# Patient Record
Sex: MALE | Race: OTHER | Employment: OTHER | URBAN - METROPOLITAN AREA
[De-identification: names, ages, dates, MRNs, and addresses within clinical notes are randomized per-mention and may not be internally consistent; named-entity substitution may affect disease eponyms.]

---

## 2017-03-15 ENCOUNTER — OFFICE VISIT (OUTPATIENT)
Dept: INTERNAL MEDICINE CLINIC | Facility: CLINIC | Age: 47
End: 2017-03-15

## 2017-03-15 VITALS
HEART RATE: 68 BPM | DIASTOLIC BLOOD PRESSURE: 62 MMHG | RESPIRATION RATE: 16 BRPM | WEIGHT: 186 LBS | BODY MASS INDEX: 34.23 KG/M2 | SYSTOLIC BLOOD PRESSURE: 98 MMHG | TEMPERATURE: 99 F | HEIGHT: 62 IN

## 2017-03-15 DIAGNOSIS — H00.14 CHALAZION LEFT UPPER EYELID: ICD-10-CM

## 2017-03-15 DIAGNOSIS — Z98.1 HISTORY OF SPINAL FUSION: ICD-10-CM

## 2017-03-15 DIAGNOSIS — M54.42 ACUTE LEFT-SIDED LOW BACK PAIN WITH LEFT-SIDED SCIATICA: ICD-10-CM

## 2017-03-15 DIAGNOSIS — M54.2 ACUTE NECK PAIN: Primary | ICD-10-CM
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
## 2017-03-15 PROCEDURE — 99214 OFFICE O/P EST MOD 30 MIN: CPT | Performed by: NURSE PRACTITIONER

## 2017-03-15 RX ORDER — NAPROXEN 500 MG/1
500 TABLET ORAL 2 TIMES DAILY WITH MEALS
Qty: 14 TABLET | Refills: 0 | Status: SHIPPED | OUTPATIENT
Start: 2017-03-15 | End: 2017-03-25 | Stop reason: ALTCHOICE

## 2017-03-15 RX ORDER — TOBRAMYCIN 3 MG/ML
SOLUTION/ DROPS OPHTHALMIC
Refills: 0 | COMMUNITY
Start: 2017-02-25 | End: 2017-04-25 | Stop reason: ALTCHOICE

## 2017-03-15 RX ORDER — CYCLOBENZAPRINE HCL 5 MG
TABLET ORAL
Refills: 0 | COMMUNITY
Start: 2017-03-10 | End: 2017-03-25 | Stop reason: ALTCHOICE

## 2017-03-15 RX ORDER — POLYMYXIN B SULFATE AND TRIMETHOPRIM 1; 10000 MG/ML; [USP'U]/ML
SOLUTION OPHTHALMIC
Refills: 0 | COMMUNITY
Start: 2017-02-25 | End: 2017-04-25 | Stop reason: ALTCHOICE

## 2017-03-15 RX ORDER — TRAMADOL HYDROCHLORIDE 50 MG/1
TABLET ORAL
Refills: 0 | COMMUNITY
Start: 2017-03-10 | End: 2017-03-25 | Stop reason: ALTCHOICE
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
## 2017-03-16 ENCOUNTER — HOSPITAL ENCOUNTER (OUTPATIENT)
Dept: GENERAL RADIOLOGY | Facility: HOSPITAL | Age: 47
Discharge: HOME | End: 2017-03-16
Attending: NURSE PRACTITIONER
Payer: COMMERCIAL

## 2017-03-16 DIAGNOSIS — M54.2 ACUTE NECK PAIN: ICD-10-CM

## 2017-03-16 DIAGNOSIS — M54.42 ACUTE LEFT-SIDED LOW BACK PAIN WITH LEFT-SIDED SCIATICA: ICD-10-CM

## 2017-03-16 PROCEDURE — 72110 X-RAY EXAM L-2 SPINE 4/>VWS: CPT

## 2017-03-16 PROCEDURE — 72050 X-RAY EXAM NECK SPINE 4/5VWS: CPT

## 2017-03-21 ENCOUNTER — OFFICE VISIT (OUTPATIENT)
Dept: PHYSICAL THERAPY | Age: 47
End: 2017-03-21
Attending: NURSE PRACTITIONER
Payer: COMMERCIAL

## 2017-03-21 DIAGNOSIS — M54.2 ACUTE NECK PAIN: Primary | ICD-10-CM

## 2017-03-21 PROCEDURE — 97161 PT EVAL LOW COMPLEX 20 MIN: CPT

## 2017-03-21 PROCEDURE — 97110 THERAPEUTIC EXERCISES: CPT

## 2017-03-21 NOTE — PROGRESS NOTES
SPINE EVALUATION:   Referring Physician: Dr. Dave Helton  Diagnosis: Lumbar spine muscle spasms    Date of Service: 3/21/2017     PATIENT SUMMARY   Jack Rivero is a 55year old y/o male who presents to therapy today with complaints of mid, low back pain, lumbar spine surgical incision (old fusion). No shift or scoliosis noted. Kyphotic/flexed trunk in standing. T/L junction with increased tissue girth (edema?)    Neuro Screen: No deficits noted with light touch to lower legs and foot.  LE DTR's symmetrical, sitting such as with occupation of  with minimal difficulty or pain.   -Complaint and independent in progressive HEP. Frequency / Duration: Patient will be seen for 2 x/week or a total of 12 visits over a 90 day period.  Treatment will inclu

## 2017-03-23 ENCOUNTER — OFFICE VISIT (OUTPATIENT)
Dept: PHYSICAL THERAPY | Age: 47
End: 2017-03-23
Attending: NURSE PRACTITIONER
Payer: COMMERCIAL

## 2017-03-23 ENCOUNTER — TELEPHONE (OUTPATIENT)
Dept: INTERNAL MEDICINE CLINIC | Facility: CLINIC | Age: 47
End: 2017-03-23

## 2017-03-23 PROCEDURE — 97110 THERAPEUTIC EXERCISES: CPT

## 2017-03-23 PROCEDURE — 97112 NEUROMUSCULAR REEDUCATION: CPT

## 2017-03-23 PROCEDURE — 97140 MANUAL THERAPY 1/> REGIONS: CPT

## 2017-03-23 NOTE — TELEPHONE ENCOUNTER
Called patient advised form received but being off work was not discussed with JV at apt.   Pt states that he thought when Sal ordered PT for two weeks it meant that he would be off of work for two weeks, pt advised I do not see any documentation indicati

## 2017-03-23 NOTE — PROGRESS NOTES
Dx: Lumbar spine muscle spasms         Authorized # of Visits:  10         Next MD visit: none scheduled  Fall Risk: standard         Precautions: n/a             Subjective: Reports he is walking up to 15 minutes at a time, increasing activity level.   Trice Berry from floor with minimal difficulty or pain. - Improve posture awareness so pt can self correct, tolerate prolonged sitting such as with occupation of  with minimal difficulty or pain.   -Complaint and independent in progressive HEP.      Plan:

## 2017-03-25 ENCOUNTER — OFFICE VISIT (OUTPATIENT)
Dept: INTERNAL MEDICINE CLINIC | Facility: CLINIC | Age: 47
End: 2017-03-25

## 2017-03-25 VITALS
HEIGHT: 69 IN | HEART RATE: 56 BPM | DIASTOLIC BLOOD PRESSURE: 72 MMHG | WEIGHT: 182 LBS | RESPIRATION RATE: 16 BRPM | SYSTOLIC BLOOD PRESSURE: 116 MMHG | TEMPERATURE: 98 F | BODY MASS INDEX: 26.96 KG/M2

## 2017-03-25 DIAGNOSIS — R20.8 NUMBNESS OF LEFT FOOT: ICD-10-CM

## 2017-03-25 DIAGNOSIS — M54.16 LUMBAR BACK PAIN WITH RADICULOPATHY AFFECTING LEFT LOWER EXTREMITY: Primary | ICD-10-CM

## 2017-03-25 PROCEDURE — 99213 OFFICE O/P EST LOW 20 MIN: CPT | Performed by: NURSE PRACTITIONER

## 2017-03-25 RX ORDER — CYCLOBENZAPRINE HCL 10 MG
10 TABLET ORAL NIGHTLY PRN
Qty: 20 TABLET | Refills: 0 | Status: SHIPPED | OUTPATIENT
Start: 2017-03-25 | End: 2017-04-13 | Stop reason: ALTCHOICE

## 2017-03-25 RX ORDER — METHYLPREDNISOLONE 4 MG/1
TABLET ORAL
Qty: 1 KIT | Refills: 0 | Status: SHIPPED | OUTPATIENT
Start: 2017-03-25 | End: 2017-04-13 | Stop reason: ALTCHOICE
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
## 2017-03-27 ENCOUNTER — OFFICE VISIT (OUTPATIENT)
Dept: PHYSICAL THERAPY | Age: 47
End: 2017-03-27
Attending: NURSE PRACTITIONER
Payer: COMMERCIAL

## 2017-03-27 ENCOUNTER — APPOINTMENT (OUTPATIENT)
Dept: PHYSICAL THERAPY | Age: 47
End: 2017-03-27
Attending: NURSE PRACTITIONER

## 2017-03-27 PROCEDURE — 97110 THERAPEUTIC EXERCISES: CPT

## 2017-03-27 PROCEDURE — 97112 NEUROMUSCULAR REEDUCATION: CPT

## 2017-03-27 NOTE — PROGRESS NOTES
Dx: Lumbar spine muscle spasms         Authorized # of Visits:  10         Next MD visit: none scheduled  Fall Risk: standard         Precautions: n/a             Subjective: Reports he is performing HEP as asked.  Back pain and stiffness has decreased some minimal to slight today, improving. Reviewed initial HEP which required cueing to perform correctly. Goals being addressed, none met. May not need cervical spine screen and tx due to no complaints with any current tx ex, demonstrates good fluid motions.

## 2017-03-28 ENCOUNTER — OFFICE VISIT (OUTPATIENT)
Dept: PHYSICAL THERAPY | Age: 47
End: 2017-03-28
Attending: NURSE PRACTITIONER
Payer: COMMERCIAL

## 2017-03-28 PROCEDURE — 97110 THERAPEUTIC EXERCISES: CPT

## 2017-03-28 PROCEDURE — 97112 NEUROMUSCULAR REEDUCATION: CPT

## 2017-03-28 NOTE — PROGRESS NOTES
Dx: Lumbar spine muscle spasms         Authorized # of Visits:  10         Next MD visit: none scheduled  Fall Risk: standard         Precautions: n/a             Subjective: Reports he began an oral steroid yesterday.   Was issued by MD who he has weekly f each Seated: B OH rows with #20 wt x 10 2 sets Shuttle:  -B squats, level 6, x 15   -B heel raises, level 6,   X 15 reps       Hook lying:  Passive L and R piriformis stretches hold 20 sec x 3 each Shuttle:  -B squats, level 6, x 15 2 sets  -B heel raises, Time: 50 min

## 2017-03-30 ENCOUNTER — APPOINTMENT (OUTPATIENT)
Dept: PHYSICAL THERAPY | Age: 47
End: 2017-03-30
Attending: NURSE PRACTITIONER

## 2017-03-31 ENCOUNTER — OFFICE VISIT (OUTPATIENT)
Dept: INTERNAL MEDICINE CLINIC | Facility: CLINIC | Age: 47
End: 2017-03-31

## 2017-03-31 VITALS
SYSTOLIC BLOOD PRESSURE: 118 MMHG | DIASTOLIC BLOOD PRESSURE: 70 MMHG | BODY MASS INDEX: 27.01 KG/M2 | HEART RATE: 76 BPM | HEIGHT: 69 IN | RESPIRATION RATE: 16 BRPM | TEMPERATURE: 99 F | WEIGHT: 182.38 LBS

## 2017-03-31 DIAGNOSIS — M54.16 LUMBAR RADICULOPATHY: Primary | ICD-10-CM

## 2017-03-31 PROCEDURE — 99213 OFFICE O/P EST LOW 20 MIN: CPT | Performed by: NURSE PRACTITIONER
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
## 2017-04-03 ENCOUNTER — APPOINTMENT (OUTPATIENT)
Dept: PHYSICAL THERAPY | Age: 47
End: 2017-04-03
Attending: NURSE PRACTITIONER

## 2017-04-03 ENCOUNTER — TELEPHONE (OUTPATIENT)
Dept: INTERNAL MEDICINE CLINIC | Facility: CLINIC | Age: 47
End: 2017-04-03

## 2017-04-03 ENCOUNTER — OFFICE VISIT (OUTPATIENT)
Dept: PHYSICAL THERAPY | Age: 47
End: 2017-04-03
Attending: NURSE PRACTITIONER

## 2017-04-03 DIAGNOSIS — M54.16 LUMBAR RADICULOPATHY: Primary | ICD-10-CM

## 2017-04-03 PROCEDURE — 97112 NEUROMUSCULAR REEDUCATION: CPT

## 2017-04-03 PROCEDURE — 97140 MANUAL THERAPY 1/> REGIONS: CPT

## 2017-04-03 PROCEDURE — 97110 THERAPEUTIC EXERCISES: CPT

## 2017-04-03 NOTE — PROGRESS NOTES
Dx: Lumbar spine muscle spasms         Authorized # of Visits:  10         Next MD visit: none scheduled  Fall Risk: standard         Precautions: n/a             Subjective: Pt reports that majority of back symptoms have resolved except at lumbar surgical PT  10 min  Prone:  -thoracic and lumbar central P/A's and spring tests, all with local pain, worse lower lumbar      Stand erect against wall,  Alternate OH arm raises, main neutral posture,  X 20 each L/R trunk rotation 2.5 wt x 10 2 sets each side Seate flexibility to WFL's so pt can lift light objects such as bag of groceries from floor with minimal difficulty or pain.   - Improve posture awareness so pt can self correct, tolerate prolonged sitting such as with occupation of  with minimal diff

## 2017-04-03 NOTE — TELEPHONE ENCOUNTER
LOV 3/31/17  With SD Noted that : Pt prefers to see Dr Dominic King at [facility] as that is who did his previous surgery. Per pt- Dr Rod Pearce no longer takes his insurance.  Pt was advise to ask insurance what providers are covered- Pt prefers SD

## 2017-04-05 ENCOUNTER — OFFICE VISIT (OUTPATIENT)
Dept: PHYSICAL THERAPY | Age: 47
End: 2017-04-05
Attending: NURSE PRACTITIONER

## 2017-04-05 PROCEDURE — 97112 NEUROMUSCULAR REEDUCATION: CPT

## 2017-04-05 PROCEDURE — 97110 THERAPEUTIC EXERCISES: CPT
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
## 2017-04-05 NOTE — PROGRESS NOTES
Dx: Lumbar spine muscle spasms         Authorized # of Visits:  10         Next MD visit: none scheduled  Fall Risk: standard         Precautions: n/a             Subjective: States back \" is a little better\" today.   C/o L lateral foot foot paresthesia \ machine:  -B lat pullbacks #10 wt  X 10 2 sets L/R trunk rotation 2.5 wt x 15 each side Man PT  10 min  Prone:  -thoracic and lumbar central P/A's and spring tests, all with local pain, worse lower lumbar      Stand erect against wall,  Alternate OH arm ra sitting such as with occupation of  with minimal difficulty or pain.   -Complaint and independent in progressive HEP. Plan:  Progress program and HEP flexibility and stability program to CKC exercises.     Charges:  there ex 1  NM re ed 2

## 2017-04-05 NOTE — TELEPHONE ENCOUNTER
Pt states he called Dr. Nadeem Greene office, there is no referral.  States first opening is not until April 25th.

## 2017-04-06 ENCOUNTER — APPOINTMENT (OUTPATIENT)
Dept: PHYSICAL THERAPY | Age: 47
End: 2017-04-06
Attending: NURSE PRACTITIONER

## 2017-04-06 ENCOUNTER — HOSPITAL ENCOUNTER (OUTPATIENT)
Dept: MRI IMAGING | Facility: HOSPITAL | Age: 47
Discharge: HOME | End: 2017-04-06
Attending: NURSE PRACTITIONER
Payer: COMMERCIAL

## 2017-04-06 DIAGNOSIS — M54.16 LUMBAR BACK PAIN WITH RADICULOPATHY AFFECTING LEFT LOWER EXTREMITY: ICD-10-CM

## 2017-04-06 DIAGNOSIS — R20.8 NUMBNESS OF LEFT FOOT: ICD-10-CM

## 2017-04-06 PROCEDURE — 72148 MRI LUMBAR SPINE W/O DYE: CPT

## 2017-04-06 NOTE — TELEPHONE ENCOUNTER
S/w pt, he said he was offered the option of seeing the nurse practitioner, but he wanted to see the doctor.   Suggested that he schedule with the NP to get started, otherwise he would need to wait for 4/25 with the Dr.  He will call us back if he has a pro

## 2017-04-10 ENCOUNTER — APPOINTMENT (OUTPATIENT)
Dept: PHYSICAL THERAPY | Age: 47
End: 2017-04-10
Attending: NURSE PRACTITIONER

## 2017-04-10 ENCOUNTER — OFFICE VISIT (OUTPATIENT)
Dept: PHYSICAL THERAPY | Age: 47
End: 2017-04-10
Attending: NURSE PRACTITIONER

## 2017-04-10 PROCEDURE — 97112 NEUROMUSCULAR REEDUCATION: CPT

## 2017-04-10 NOTE — PROGRESS NOTES
Dx: Lumbar spine muscle spasms         Authorized # of Visits:  10         Next MD visit: none scheduled  Fall Risk: standard         Precautions: n/a             Subjective: Had MRI. Was told has bulging disc.   Remains with c/o \"pinching\" at T/L region SLR   negative    -LE DTR's WNL's    L and R lower quadrant test into extension negative Modified dead lift with B UE reach to chair seat height x 5 each side  3 sets each    Sit: trunk flex stretch hold 10 sec x 3 Modified dead lift with B UE reach to [place] met.  Intermittent pain making remaining goals inconsistent at this time, but is steadily progressing in those areas.      Goals:   -Restore lumbar and thoracic spine AROM to WFL's so pt can return to performing LE bathing and dressing activities with minim

## 2017-04-12 ENCOUNTER — OFFICE VISIT (OUTPATIENT)
Dept: PHYSICAL THERAPY | Age: 47
End: 2017-04-12
Attending: NURSE PRACTITIONER

## 2017-04-12 PROCEDURE — 97530 THERAPEUTIC ACTIVITIES: CPT

## 2017-04-12 NOTE — PROGRESS NOTES
Dx: Lumbar spine muscle spasms         Authorized # of Visits:  10         Next MD visit: none scheduled  Fall Risk: standard         Precautions: n/a             Subjective:  No complaints now, no pain.  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 These activities needed for daily house chores, yard work, demands of  job. Overall pt did well.   No productio

## 2017-04-13 ENCOUNTER — APPOINTMENT (OUTPATIENT)
Dept: PHYSICAL THERAPY | Age: 47
End: 2017-04-13
Attending: NURSE PRACTITIONER

## 2017-04-13 ENCOUNTER — OFFICE VISIT (OUTPATIENT)
Dept: SURGERY | Facility: CLINIC | Age: 47
End: 2017-04-13

## 2017-04-13 VITALS
DIASTOLIC BLOOD PRESSURE: 66 MMHG | HEIGHT: 69 IN | WEIGHT: 180 LBS | SYSTOLIC BLOOD PRESSURE: 114 MMHG | HEART RATE: 72 BPM | BODY MASS INDEX: 26.66 KG/M2 | RESPIRATION RATE: 16 BRPM

## 2017-04-13 DIAGNOSIS — M54.16 LUMBAR RADICULOPATHY: Primary | ICD-10-CM

## 2017-04-13 DIAGNOSIS — M51.36 DDD (DEGENERATIVE DISC DISEASE), LUMBAR: ICD-10-CM

## 2017-04-13 DIAGNOSIS — Z98.1 HISTORY OF LUMBAR FUSION: ICD-10-CM

## 2017-04-13 PROCEDURE — 99204 OFFICE O/P NEW MOD 45 MIN: CPT | Performed by: NURSE PRACTITIONER

## 2017-04-13 NOTE — PATIENT INSTRUCTIONS
Refill policies:    • Allow 2 business days for refills; controlled substances may take longer.   • Contact your pharmacy at least 5 days prior to running out of medication and have them send an electronic request or submit request through the “request re insurance carrier to obtain pre-certification or prior authorization. Unfortunately, [facility] has seen an increase in denial of payment even though the procedure/test has been pre-certified.   You are strongly encouraged to contact your insurance carrier to v
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
## 2017-04-13 NOTE — PROGRESS NOTES
Location of Pain:     Date Pain Began: March 8th          Work Related:   No        Receiving Work Comp/Disability:   No    Numeric Rating Scale:  Pain at Present:  5

## 2017-04-17 ENCOUNTER — APPOINTMENT (OUTPATIENT)
Dept: PHYSICAL THERAPY | Age: 47
End: 2017-04-17
Attending: NURSE PRACTITIONER

## 2017-04-20 ENCOUNTER — APPOINTMENT (OUTPATIENT)
Dept: PHYSICAL THERAPY | Age: 47
End: 2017-04-20
Attending: NURSE PRACTITIONER

## 2017-04-25 ENCOUNTER — OFFICE VISIT (OUTPATIENT)
Dept: SURGERY | Facility: CLINIC | Age: 47
End: 2017-04-25

## 2017-04-25 VITALS
DIASTOLIC BLOOD PRESSURE: 78 MMHG | BODY MASS INDEX: 26.07 KG/M2 | SYSTOLIC BLOOD PRESSURE: 126 MMHG | WEIGHT: 176 LBS | RESPIRATION RATE: 16 BRPM | HEART RATE: 68 BPM | HEIGHT: 69 IN
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
## 2017-04-25 DIAGNOSIS — Z98.1 HISTORY OF SPINAL FUSION: Primary | ICD-10-CM
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
## 2017-04-25 PROCEDURE — 99213 OFFICE O/P EST LOW 20 MIN: CPT | Performed by: NEUROLOGICAL SURGERY
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
## 2017-04-26 ENCOUNTER — OFFICE VISIT (OUTPATIENT)
Dept: PHYSICAL THERAPY | Age: 47
End: 2017-04-26
Attending: NURSE PRACTITIONER
Payer: COMMERCIAL

## 2017-04-26 PROCEDURE — 97140 MANUAL THERAPY 1/> REGIONS: CPT

## 2017-04-26 PROCEDURE — 97112 NEUROMUSCULAR REEDUCATION: CPT

## 2017-04-26 NOTE — PROGRESS NOTES
Dx: Lumbar spine muscle spasms         Authorized # of Visits:  10         Next MD visit: none scheduled  Fall Risk: standard         Precautions: n/a             Subjective:  Reports decreased pain since last seen.   Lateral L foot paresthesia remains inte total  2  Sets   Man PT:  -mid to lower t spine manip's supine and prone  10 min    -lumbar P/A's upper/mid  Lumbar grade 2,3,4  6 min    HEP:  seated lumbar flex ROM stretch in chair, hold 15 sec x 3 Stand erect against wall,  Alternate OH arm raises, leah wt x 10 2 sets Shuttle:  -B squats, level 6, x 15   -B heel raises, level 6,   X 15 reps    L and R trunk rotation with diagonal  High to low lifts, 7.5 wt x 10 2 sets each side     Hook lying:  Passive L and R piriformis stretches hold 20 sec x 3 each Eulis Sloop

## 2017-04-28 ENCOUNTER — TELEPHONE (OUTPATIENT)
Dept: INTERNAL MEDICINE CLINIC | Facility: CLINIC | Age: 47
End: 2017-04-28

## 2017-04-28 NOTE — TELEPHONE ENCOUNTER
Called pt to advise info per JV that referral should be valid. Pt states that he will call and make appt. Pt states that if any problems he will call back with new provider's name so referral can be changed. Pt had no further questions at this time.

## 2017-05-01 ENCOUNTER — OFFICE VISIT (OUTPATIENT)
Dept: PHYSICAL THERAPY | Age: 47
End: 2017-05-01
Attending: NURSE PRACTITIONER
Payer: COMMERCIAL

## 2017-05-01 NOTE — PROGRESS NOTES
Dx: Lumbar spine muscle spasms         Authorized # of Visits:  10         Next MD visit: none scheduled  Fall Risk: standard         Precautions: n/a         Physical Therapy Discharge Report  10 sessions completed.         Subjective:  Reports majority of negative    6 min -stand: L and R lateral trunk bend x 10 each     -L and R lateral lateral lunges x 15 each -stand: L and R lateral trunk bend x 10 each     -L and R  lateral lunges x 15 each Cable machine:  -alternate punches/pushing, # 10 each x 20 tota tests, MMT LE's and trunk, ROM. 8 min. See MD report.     Stand erect against wall,  Alternate OH arm raises, main neutral posture,  X 20 each L/R trunk rotation 2.5 wt x 10 2 sets each side Seated: B OH rows with #20 wt x 15 2    Standing, alternate delt AROM to WFL's so pt can return to performing LE bathing and dressing activities with minimal difficulty and pain.   -Restore trunk strength to WFL's so pt can perform standing activities such as with food preparation with minimal difficulty or pain.   -Impr

## 2017-05-18 ENCOUNTER — OFFICE VISIT (OUTPATIENT)
Dept: SURGERY | Facility: CLINIC | Age: 47
End: 2017-05-18

## 2017-05-18 VITALS — DIASTOLIC BLOOD PRESSURE: 60 MMHG | HEART RATE: 60 BPM | SYSTOLIC BLOOD PRESSURE: 112 MMHG
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
## 2017-05-18 DIAGNOSIS — M51.36 DDD (DEGENERATIVE DISC DISEASE), LUMBAR: ICD-10-CM

## 2017-05-18 PROCEDURE — 99213 OFFICE O/P EST LOW 20 MIN: CPT | Performed by: NEUROLOGICAL SURGERY

## 2017-05-18 RX ORDER — DIAZEPAM 10 MG/1
5 TABLET ORAL ONCE AS NEEDED
Qty: 2 TABLET | Refills: 0 | Status: SHIPPED | OUTPATIENT
Start: 2017-05-18 | End: 2017-05-18

## 2017-05-18 NOTE — PROGRESS NOTES
Neurosurgery Clinic Visit  2017    Rachna Barbosa PCP:  Kristen An MD    10/24/1970 MRN [id number]     HISTORY OF PRESENT ILLNESS:  Rachna Barbosa is a(n) 55year old male who is here for follow-up of low back pain and radiculopathy.   Si

## 2017-05-18 NOTE — PROGRESS NOTES
Pt reports he has occasional pain in lower back and in left foot. Pain usually comes on when sitting for to long.  Pt unable to rate pain on pain scale, \"pain but not severe pain\"

## 2017-05-25 ENCOUNTER — TELEPHONE (OUTPATIENT)
Dept: SURGERY | Facility: CLINIC | Age: 47
End: 2017-05-25

## 2017-05-26 ENCOUNTER — TELEPHONE (OUTPATIENT)
Dept: INTERNAL MEDICINE CLINIC | Facility: CLINIC | Age: 47
End: 2017-05-26

## 2017-05-26 ENCOUNTER — HOSPITAL ENCOUNTER (OUTPATIENT)
Dept: MRI IMAGING | Facility: HOSPITAL | Age: 47
Discharge: HOME | End: 2017-05-26
Attending: NURSE PRACTITIONER
Payer: COMMERCIAL

## 2017-05-26 DIAGNOSIS — M54.16 LUMBAR RADICULOPATHY: ICD-10-CM

## 2017-05-26 PROCEDURE — 72148 MRI LUMBAR SPINE W/O DYE: CPT | Performed by: NURSE PRACTITIONER

## 2017-05-26 NOTE — TELEPHONE ENCOUNTER
Pt was notified that letter has been generated. Pt states that he will come in 5/24/17 to  letter. Letter is at  folder.  Nothing further
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
## 2017-06-01 ENCOUNTER — OFFICE VISIT (OUTPATIENT)
Dept: SURGERY | Facility: CLINIC | Age: 47
End: 2017-06-01

## 2017-06-01 VITALS — SYSTOLIC BLOOD PRESSURE: 110 MMHG | DIASTOLIC BLOOD PRESSURE: 60 MMHG | RESPIRATION RATE: 17 BRPM | HEART RATE: 60 BPM
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
## 2017-06-01 PROCEDURE — 99213 OFFICE O/P EST LOW 20 MIN: CPT | Performed by: NEUROLOGICAL SURGERY

## 2017-06-01 NOTE — PATIENT INSTRUCTIONS
Refill policies:    • Allow 2-3 business days for refills; controlled substances may take longer.   • Contact your pharmacy at least 5 days prior to running out of medication and have them send an electronic request or submit request through the [facility] have a procedure or additional testing performed. [facility]) will contact your insurance carrier to obtain pre-certification or prior authorization.     Unfortunately, [facility] has seen an increase in denial of payment even though the p

## 2017-06-02 NOTE — PROGRESS NOTES
Neurosurgery Clinic Visit  2017    Rebeccaolga Rubia PCP:  Aline Nation MD    10/24/1970 MRN [id number]     HISTORY OF PRESENT ILLNESS:  Ned Barkley is a(n) 55year old male here for f/u  Doing well  Back pain improved  Wants to go back to

## 2017-09-19 ENCOUNTER — OFFICE VISIT (OUTPATIENT)
Dept: INTERNAL MEDICINE CLINIC | Facility: CLINIC | Age: 47
End: 2017-09-19

## 2017-09-19 VITALS
DIASTOLIC BLOOD PRESSURE: 68 MMHG | HEART RATE: 72 BPM | WEIGHT: 181 LBS | SYSTOLIC BLOOD PRESSURE: 120 MMHG | HEIGHT: 69 IN | BODY MASS INDEX: 26.81 KG/M2 | TEMPERATURE: 99 F

## 2017-09-19 DIAGNOSIS — M54.10 RADICULAR LOW BACK PAIN: Primary | ICD-10-CM

## 2017-09-19 PROCEDURE — 99213 OFFICE O/P EST LOW 20 MIN: CPT | Performed by: NURSE PRACTITIONER

## 2017-09-19 RX ORDER — CYCLOBENZAPRINE HCL 10 MG
10 TABLET ORAL NIGHTLY PRN
Qty: 30 TABLET | Refills: 0 | Status: SHIPPED | OUTPATIENT
Start: 2017-09-19 | End: 2017-10-09

## 2017-09-19 RX ORDER — METHYLPREDNISOLONE 4 MG/1
TABLET ORAL
Qty: 1 KIT | Refills: 0 | Status: SHIPPED | OUTPATIENT
Start: 2017-09-19 | End: 2018-12-28

## 2017-09-19 RX ORDER — TRAMADOL HYDROCHLORIDE 50 MG/1
50 TABLET ORAL EVERY 8 HOURS PRN
Qty: 60 TABLET | Refills: 0 | Status: SHIPPED | OUTPATIENT
Start: 2017-09-19 | End: 2020-09-28
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
## 2017-10-25 ENCOUNTER — TELEPHONE (OUTPATIENT)
Dept: INTERNAL MEDICINE CLINIC | Facility: CLINIC | Age: 47
End: 2017-10-25

## 2017-10-25 DIAGNOSIS — Z13.228 SCREENING FOR METABOLIC DISORDER: ICD-10-CM

## 2017-10-25 DIAGNOSIS — Z13.220 SCREENING FOR LIPID DISORDERS: Primary | ICD-10-CM

## 2017-10-25 DIAGNOSIS — Z13.0 SCREENING FOR DISORDER OF BLOOD AND BLOOD-FORMING ORGANS: ICD-10-CM

## 2017-10-25 DIAGNOSIS — Z13.29 SCREENING FOR THYROID DISORDER: ICD-10-CM

## 2017-12-02 ENCOUNTER — LAB ENCOUNTER (OUTPATIENT)
Dept: LAB | Facility: HOSPITAL | Age: 47
End: 2017-12-02
Attending: INTERNAL MEDICINE
Payer: MEDICAID

## 2017-12-02 DIAGNOSIS — Z13.29 SCREENING FOR THYROID DISORDER: ICD-10-CM

## 2017-12-02 DIAGNOSIS — Z13.220 SCREENING FOR LIPID DISORDERS: ICD-10-CM

## 2017-12-02 DIAGNOSIS — Z13.0 SCREENING FOR DISORDER OF BLOOD AND BLOOD-FORMING ORGANS: ICD-10-CM

## 2017-12-02 DIAGNOSIS — Z13.228 SCREENING FOR METABOLIC DISORDER: ICD-10-CM

## 2017-12-02 PROCEDURE — 80061 LIPID PANEL: CPT

## 2017-12-02 PROCEDURE — 84443 ASSAY THYROID STIM HORMONE: CPT

## 2017-12-02 PROCEDURE — 80053 COMPREHEN METABOLIC PANEL: CPT

## 2017-12-02 PROCEDURE — 36415 COLL VENOUS BLD VENIPUNCTURE: CPT

## 2017-12-02 PROCEDURE — 85025 COMPLETE CBC W/AUTO DIFF WBC: CPT

## 2017-12-04 ENCOUNTER — TELEPHONE (OUTPATIENT)
Dept: INTERNAL MEDICINE CLINIC | Facility: CLINIC | Age: 47
End: 2017-12-04

## 2017-12-04 DIAGNOSIS — E55.9 VITAMIN D DEFICIENCY: Primary | ICD-10-CM

## 2017-12-04 NOTE — TELEPHONE ENCOUNTER
Pt had labs at [facility] lab on Saturday and there was no order for Vit D-the lab did draw enough blood to hold until today for us to put order in system today if AS can put order in-pt went to the main lab at the hospital out patient-please call them and let

## 2017-12-04 NOTE — TELEPHONE ENCOUNTER
Last vitamin D 4/28/15  FOV 12/8/17    Order pended for your approval if ok. Please advise. Thank you.

## 2017-12-08 ENCOUNTER — HOSPITAL ENCOUNTER (OUTPATIENT)
Dept: GENERAL RADIOLOGY | Facility: HOSPITAL | Age: 47
Discharge: HOME | End: 2017-12-08
Attending: INTERNAL MEDICINE
Payer: MEDICAID

## 2017-12-08 ENCOUNTER — LAB ENCOUNTER (OUTPATIENT)
Dept: LAB | Age: 47
End: 2017-12-08
Attending: INTERNAL MEDICINE
Payer: MEDICAID

## 2017-12-08 ENCOUNTER — OFFICE VISIT (OUTPATIENT)
Dept: INTERNAL MEDICINE CLINIC | Facility: CLINIC | Age: 47
End: 2017-12-08

## 2017-12-08 VITALS
HEIGHT: 69.75 IN | WEIGHT: 184.38 LBS | DIASTOLIC BLOOD PRESSURE: 62 MMHG | BODY MASS INDEX: 26.69 KG/M2 | HEART RATE: 60 BPM | TEMPERATURE: 98 F | SYSTOLIC BLOOD PRESSURE: 100 MMHG | RESPIRATION RATE: 12 BRPM

## 2017-12-08 DIAGNOSIS — R06.02 SOB (SHORTNESS OF BREATH): ICD-10-CM

## 2017-12-08 DIAGNOSIS — Z00.00 PE (PHYSICAL EXAM), ANNUAL: Primary | ICD-10-CM

## 2017-12-08 DIAGNOSIS — R35.1 NOCTURIA: ICD-10-CM

## 2017-12-08 DIAGNOSIS — Z12.5 SCREENING PSA (PROSTATE SPECIFIC ANTIGEN): ICD-10-CM

## 2017-12-08 DIAGNOSIS — R53.82 CHRONIC FATIGUE: ICD-10-CM

## 2017-12-08 DIAGNOSIS — M79.672 LEFT FOOT PAIN: ICD-10-CM

## 2017-12-08 PROCEDURE — 82607 VITAMIN B-12: CPT | Performed by: INTERNAL MEDICINE

## 2017-12-08 PROCEDURE — 71020 XR CHEST PA + LAT CHEST (CPT=71020): CPT | Performed by: INTERNAL MEDICINE

## 2017-12-08 PROCEDURE — 84153 ASSAY OF PSA TOTAL: CPT | Performed by: INTERNAL MEDICINE

## 2017-12-08 PROCEDURE — 93000 ELECTROCARDIOGRAM COMPLETE: CPT | Performed by: INTERNAL MEDICINE

## 2017-12-08 PROCEDURE — 85378 FIBRIN DEGRADE SEMIQUANT: CPT | Performed by: INTERNAL MEDICINE

## 2017-12-08 PROCEDURE — 82306 VITAMIN D 25 HYDROXY: CPT | Performed by: INTERNAL MEDICINE

## 2017-12-08 PROCEDURE — 99396 PREV VISIT EST AGE 40-64: CPT | Performed by: INTERNAL MEDICINE

## 2017-12-08 PROCEDURE — 81003 URINALYSIS AUTO W/O SCOPE: CPT | Performed by: INTERNAL MEDICINE

## 2017-12-08 NOTE — PROGRESS NOTES
Patient presents with:  Physical: Exam Room 8: labs completed, still having pain in left toes, complaints of fatigue as well as some SOB, defers flu shot      HPI:  Here for cpe.  Pt still with low back pain previous fusion with some left foot pain which pe Former Smoker                                                              Packs/day: 0.00      Years: 0.00      Smokeless tobacco: Never Used                      Comment: never really smoked  Alcohol use:  No                  Current Outpatient Prescripti changes.     A/P:    Sob (shortness of breath)  Pe (physical exam), annual  (primary encounter diagnosis)  Chronic fatigue  Nocturia  Screening psa (prostate specific antigen)  Left foot pain    See dr german for back and left foot pain  cxr for sob, ekg

## 2018-12-20 ENCOUNTER — TELEPHONE (OUTPATIENT)
Dept: INTERNAL MEDICINE CLINIC | Facility: CLINIC | Age: 48
End: 2018-12-20

## 2018-12-20 DIAGNOSIS — Z00.00 ROUTINE GENERAL MEDICAL EXAMINATION AT A HEALTH CARE FACILITY: Primary | ICD-10-CM

## 2018-12-20 NOTE — TELEPHONE ENCOUNTER
CPE sched on   Future Appointments   Date Time Provider Ilda Mercado          2/4/2019  1:15 PM Andrei Jack MD [facility]     Orders to [employer]. Aware must fast no call back required.

## 2018-12-28 ENCOUNTER — OFFICE VISIT (OUTPATIENT)
Dept: INTERNAL MEDICINE CLINIC | Facility: CLINIC | Age: 48
End: 2018-12-28
Payer: MEDICAID

## 2018-12-28 VITALS
SYSTOLIC BLOOD PRESSURE: 126 MMHG | DIASTOLIC BLOOD PRESSURE: 74 MMHG | TEMPERATURE: 98 F | HEIGHT: 69.5 IN | WEIGHT: 191 LBS | RESPIRATION RATE: 16 BRPM | BODY MASS INDEX: 27.65 KG/M2 | HEART RATE: 64 BPM

## 2018-12-28 DIAGNOSIS — M48.061 NEURAL FORAMINAL STENOSIS OF LUMBAR SPINE: ICD-10-CM

## 2018-12-28 DIAGNOSIS — M25.78 OSTEOPHYTE OF VERTEBRAE: ICD-10-CM

## 2018-12-28 DIAGNOSIS — M62.830 LUMBAR PARASPINAL MUSCLE SPASM: ICD-10-CM

## 2018-12-28 DIAGNOSIS — M51.26 LUMBAR DISC HERNIATION: ICD-10-CM

## 2018-12-28 DIAGNOSIS — M54.16 LUMBAR RADICULOPATHY: Primary | ICD-10-CM

## 2018-12-28 DIAGNOSIS — Z98.1 STATUS POST LUMBAR SPINAL FUSION: ICD-10-CM

## 2018-12-28 PROCEDURE — 99213 OFFICE O/P EST LOW 20 MIN: CPT | Performed by: INTERNAL MEDICINE

## 2018-12-28 RX ORDER — METHYLPREDNISOLONE 4 MG/1
TABLET ORAL
Qty: 1 KIT | Refills: 1 | Status: SHIPPED | OUTPATIENT
Start: 2018-12-28 | End: 2019-01-25

## 2018-12-28 RX ORDER — CYCLOBENZAPRINE HCL 10 MG
10 TABLET ORAL 2 TIMES DAILY PRN
Qty: 28 TABLET | Refills: 1 | Status: SHIPPED | OUTPATIENT
Start: 2018-12-28 | End: 2019-01-11

## 2018-12-28 NOTE — PROGRESS NOTES
Ned Barkley  10/24/1970    Patient presents with:  Pain: cn room 6: patient is here for back and leg pain, patient states its been off an on but more recently       Zaria Cain is a 50year old male who presents with acute on chron today.      Current Outpatient Medications:  methylPREDNISolone (MEDROL) 4 MG Oral Tablet Therapy Pack As directed.  Disp: 1 kit Rfl: 1   Cyclobenzaprine HCl 10 MG Oral Tab Take 1 tablet (10 mg total) by mouth 2 (two) times daily as needed for Muscle spasms lower extremity numbness.     Acute on chronic lumbar radiculopathy without myelopathy:  Attributed to chronic findings as described in the subjective and acute lumbar paraspinal muscle strain  Prescribe steroid and muscle relaxant therapies  Advised applic

## 2019-01-04 ENCOUNTER — HOSPITAL ENCOUNTER (OUTPATIENT)
Dept: PHYSICAL THERAPY | Facility: HOSPITAL | Age: 49
Setting detail: THERAPIES SERIES
Discharge: HOME | End: 2019-01-04
Attending: INTERNAL MEDICINE
Payer: MEDICAID

## 2019-01-04 PROCEDURE — 97162 PT EVAL MOD COMPLEX 30 MIN: CPT

## 2019-01-04 NOTE — PROGRESS NOTES
SPINE EVALUATION:   Referring Physician: Dr. Uribe ref.  provider found  Diagnosis: Lumbar radiculopathy (M54.16) Date of Service: 1/4/2019     PATIENT SUMMARY   Rebecca Vance is a 50year old y/o male who presents to therapy today with complaints of ba to normal and not having any numbness/tingling down the leg. Past medical history was reviewed with Ken. No significant findings.      ASSESSMENT  Patient arrived to session today with significant radiating pain from his lower back and down into the L side of his back.      Palpation: Tenderness along spine in lower back, tenderness on paraspinals on L side of the back    Strength:   LE   Hip flexion: R 4/5; L 4/5  Hip abduction: R 5/5; L 5/5  Knee Flexion: R 5/5; L 4/5   Knee extension: R 5/5; L 4/5 * seen for 1-2 x/week or a total of 8 visits over a 90 day period. Treatment will include: Manual Therapy; Therapeutic Exercises; Neuromuscular Re-education; Therapeutic Activity;  Electrical Stim; Mechanical Traction; Pt education; Home exercise program inst

## 2019-01-09 ENCOUNTER — HOSPITAL ENCOUNTER (OUTPATIENT)
Dept: PHYSICAL THERAPY | Facility: HOSPITAL | Age: 49
Setting detail: THERAPIES SERIES
Discharge: HOME | End: 2019-01-09
Attending: INTERNAL MEDICINE
Payer: MEDICAID

## 2019-01-09 PROCEDURE — 97140 MANUAL THERAPY 1/> REGIONS: CPT

## 2019-01-09 PROCEDURE — 97110 THERAPEUTIC EXERCISES: CPT

## 2019-01-09 NOTE — PROGRESS NOTES
Dx: Low Back Pain With Radiculopathy         Authorized # of Visits:  6         Next MD visit: none scheduled  Fall Risk: standard         Precautions: n/a             Subjective:  The patient states that he is feeling a bit better than he was last week, bu 3/   Date:               TX#: 4/ Date:               TX#: 5/ Date:               TX#: 6/ Date:               TX#: 7/ Date:               TX#: 8/   STM to L lumbar paraspinals, quadratus lumborum, piriformis for pain relief         hooklying lower lumbar ro

## 2019-01-10 ENCOUNTER — APPOINTMENT (OUTPATIENT)
Dept: PHYSICAL THERAPY | Facility: HOSPITAL | Age: 49
End: 2019-01-10
Attending: INTERNAL MEDICINE
Payer: MEDICAID

## 2019-01-18 ENCOUNTER — APPOINTMENT (OUTPATIENT)
Dept: PHYSICAL THERAPY | Facility: HOSPITAL | Age: 49
End: 2019-01-18
Attending: INTERNAL MEDICINE
Payer: MEDICAID

## 2019-01-25 ENCOUNTER — HOSPITAL ENCOUNTER (OUTPATIENT)
Dept: PHYSICAL THERAPY | Facility: HOSPITAL | Age: 49
Setting detail: THERAPIES SERIES
Discharge: HOME | End: 2019-01-25
Attending: INTERNAL MEDICINE
Payer: MEDICAID

## 2019-01-25 ENCOUNTER — OFFICE VISIT (OUTPATIENT)
Dept: INTERNAL MEDICINE CLINIC | Facility: CLINIC | Age: 49
End: 2019-01-25
Payer: MEDICAID

## 2019-01-25 ENCOUNTER — LAB ENCOUNTER (OUTPATIENT)
Dept: LAB | Age: 49
End: 2019-01-25
Attending: INTERNAL MEDICINE
Payer: MEDICAID

## 2019-01-25 VITALS
DIASTOLIC BLOOD PRESSURE: 76 MMHG | RESPIRATION RATE: 16 BRPM | SYSTOLIC BLOOD PRESSURE: 118 MMHG | HEART RATE: 60 BPM | BODY MASS INDEX: 27.39 KG/M2 | TEMPERATURE: 98 F | WEIGHT: 189.19 LBS | HEIGHT: 69.5 IN

## 2019-01-25 DIAGNOSIS — Z00.00 ROUTINE GENERAL MEDICAL EXAMINATION AT A HEALTH CARE FACILITY: ICD-10-CM

## 2019-01-25 DIAGNOSIS — M48.061 NEURAL FORAMINAL STENOSIS OF LUMBAR SPINE: ICD-10-CM

## 2019-01-25 DIAGNOSIS — Z00.00 ANNUAL PHYSICAL EXAM: Primary | ICD-10-CM

## 2019-01-25 DIAGNOSIS — Z13.29 THYROID DISORDER SCREENING: ICD-10-CM

## 2019-01-25 DIAGNOSIS — E53.8 VITAMIN B12 DEFICIENCY: ICD-10-CM

## 2019-01-25 DIAGNOSIS — Z13.228 SCREENING FOR METABOLIC DISORDER: ICD-10-CM

## 2019-01-25 DIAGNOSIS — Z13.0 SCREENING FOR BLOOD DISEASE: ICD-10-CM

## 2019-01-25 DIAGNOSIS — Z13.220 SCREENING FOR LIPID DISORDERS: ICD-10-CM

## 2019-01-25 DIAGNOSIS — Z00.00 ANNUAL PHYSICAL EXAM: ICD-10-CM

## 2019-01-25 DIAGNOSIS — M54.16 LUMBAR RADICULOPATHY: ICD-10-CM

## 2019-01-25 DIAGNOSIS — Z13.1 SCREENING FOR DIABETES MELLITUS: ICD-10-CM

## 2019-01-25 DIAGNOSIS — E55.9 VITAMIN D DEFICIENCY: ICD-10-CM

## 2019-01-25 DIAGNOSIS — S33.131S: ICD-10-CM

## 2019-01-25 DIAGNOSIS — Z12.5 PROSTATE CANCER SCREENING: ICD-10-CM

## 2019-01-25 LAB
ALBUMIN SERPL-MCNC: 4.3 G/DL (ref 3.1–4.5)
ALBUMIN/GLOB SERPL: 1.3 {RATIO} (ref 1–2)
ALP LIVER SERPL-CCNC: 46 U/L (ref 45–117)
ALT SERPL-CCNC: 43 U/L (ref 17–63)
ANION GAP SERPL CALC-SCNC: 5 MMOL/L (ref 0–18)
AST SERPL-CCNC: 23 U/L (ref 15–41)
BASOPHILS # BLD AUTO: 0.06 X10(3) UL (ref 0–0.1)
BASOPHILS NFR BLD AUTO: 1 %
BILIRUB SERPL-MCNC: 0.4 MG/DL (ref 0.1–2)
BUN BLD-MCNC: 13 MG/DL (ref 8–20)
BUN/CREAT SERPL: 12.6 (ref 10–20)
CALCIUM BLD-MCNC: 8.8 MG/DL (ref 8.3–10.3)
CHLORIDE SERPL-SCNC: 106 MMOL/L (ref 101–111)
CHOLEST SMN-MCNC: 182 MG/DL (ref ?–200)
CO2 SERPL-SCNC: 29 MMOL/L (ref 22–32)
COMPLEXED PSA SERPL-MCNC: 1.04 NG/ML (ref 0.01–4)
CREAT BLD-MCNC: 1.03 MG/DL (ref 0.7–1.3)
EOSINOPHIL # BLD AUTO: 0.12 X10(3) UL (ref 0–0.3)
EOSINOPHIL NFR BLD AUTO: 1.9 %
ERYTHROCYTE [DISTWIDTH] IN BLOOD BY AUTOMATED COUNT: 12 % (ref 11.5–16)
EST. AVERAGE GLUCOSE BLD GHB EST-MCNC: 120 MG/DL (ref 68–126)
GLOBULIN PLAS-MCNC: 3.2 G/DL (ref 2.8–4.4)
GLUCOSE BLD-MCNC: 93 MG/DL (ref 70–99)
HAV AB SERPL IA-ACNC: 363 PG/ML (ref 193–986)
HBA1C MFR BLD HPLC: 5.8 % (ref ?–5.7)
HCT VFR BLD AUTO: 44.3 % (ref 37–53)
HDLC SERPL-MCNC: 43 MG/DL (ref 40–59)
HGB BLD-MCNC: 15.1 G/DL (ref 13–17)
IMMATURE GRANULOCYTE COUNT: 0.03 X10(3) UL (ref 0–1)
IMMATURE GRANULOCYTE RATIO %: 0.5 %
LDLC SERPL CALC-MCNC: 114 MG/DL (ref ?–100)
LYMPHOCYTES # BLD AUTO: 2.71 X10(3) UL (ref 0.9–4)
LYMPHOCYTES NFR BLD AUTO: 43.5 %
M PROTEIN MFR SERPL ELPH: 7.5 G/DL (ref 6.4–8.2)
MCH RBC QN AUTO: 31.5 PG (ref 27–33.2)
MCHC RBC AUTO-ENTMCNC: 34.1 G/DL (ref 31–37)
MCV RBC AUTO: 92.3 FL (ref 80–99)
MONOCYTES # BLD AUTO: 0.39 X10(3) UL (ref 0.1–1)
MONOCYTES NFR BLD AUTO: 6.3 %
NEUTROPHIL ABS PRELIM: 2.92 X10 (3) UL (ref 1.3–6.7)
NEUTROPHILS # BLD AUTO: 2.92 X10(3) UL (ref 1.3–6.7)
NEUTROPHILS NFR BLD AUTO: 46.8 %
NONHDLC SERPL-MCNC: 139 MG/DL (ref ?–130)
OSMOLALITY SERPL CALC.SUM OF ELEC: 290 MOSM/KG (ref 275–295)
PLATELET # BLD AUTO: 186 10(3)UL (ref 150–450)
POTASSIUM SERPL-SCNC: 3.9 MMOL/L (ref 3.6–5.1)
RBC # BLD AUTO: 4.8 X10(6)UL (ref 4.3–5.7)
RED CELL DISTRIBUTION WIDTH-SD: 40.7 FL (ref 35.1–46.3)
SODIUM SERPL-SCNC: 140 MMOL/L (ref 136–144)
TRIGL SERPL-MCNC: 127 MG/DL (ref 30–149)
TSI SER-ACNC: 1.91 MIU/ML (ref 0.35–5.5)
VIT D+METAB SERPL-MCNC: 34.4 NG/ML (ref 30–100)
VLDLC SERPL CALC-MCNC: 25 MG/DL (ref 0–30)
WBC # BLD AUTO: 6.2 X10(3) UL (ref 4–13)

## 2019-01-25 PROCEDURE — 85025 COMPLETE CBC W/AUTO DIFF WBC: CPT

## 2019-01-25 PROCEDURE — 82306 VITAMIN D 25 HYDROXY: CPT

## 2019-01-25 PROCEDURE — 97110 THERAPEUTIC EXERCISES: CPT

## 2019-01-25 PROCEDURE — 80053 COMPREHEN METABOLIC PANEL: CPT

## 2019-01-25 PROCEDURE — 82607 VITAMIN B-12: CPT

## 2019-01-25 PROCEDURE — 99396 PREV VISIT EST AGE 40-64: CPT | Performed by: INTERNAL MEDICINE

## 2019-01-25 PROCEDURE — 84443 ASSAY THYROID STIM HORMONE: CPT

## 2019-01-25 PROCEDURE — 80061 LIPID PANEL: CPT

## 2019-01-25 PROCEDURE — 83036 HEMOGLOBIN GLYCOSYLATED A1C: CPT

## 2019-01-25 NOTE — PROGRESS NOTES
Dx: Low Back Pain With Radiculopathy         Authorized # of Visits:  6         Next MD visit: none scheduled  Fall Risk: standard         Precautions: n/a             Subjective:  The patient reports that the intensity of his pain isn't as high as it used Date:               TX#: 4/ Date:               TX#: 5/ Date:               TX#: 6/ Date:               TX#: 7/ Date:               TX#: 8/   STM to L lumbar paraspinals, quadratus lumborum, piriformis for pain relief hooklying lower lumbar rotation stretc
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
## 2019-02-01 ENCOUNTER — APPOINTMENT (OUTPATIENT)
Dept: PHYSICAL THERAPY | Facility: HOSPITAL | Age: 49
End: 2019-02-01
Attending: INTERNAL MEDICINE
Payer: MEDICAID

## 2020-09-13 ENCOUNTER — HOSPITAL ENCOUNTER (OUTPATIENT)
Age: 50
Discharge: HOME | End: 2020-09-13
Payer: MEDICAID

## 2020-09-13 VITALS
BODY MASS INDEX: 27.11 KG/M2 | RESPIRATION RATE: 20 BRPM | WEIGHT: 183 LBS | HEART RATE: 69 BPM | SYSTOLIC BLOOD PRESSURE: 142 MMHG | TEMPERATURE: 98 F | HEIGHT: 69 IN | OXYGEN SATURATION: 98 % | DIASTOLIC BLOOD PRESSURE: 72 MMHG

## 2020-09-13 DIAGNOSIS — S39.012A STRAIN OF LUMBAR REGION, INITIAL ENCOUNTER: Primary | ICD-10-CM

## 2020-09-13 PROCEDURE — 96372 THER/PROPH/DIAG INJ SC/IM: CPT

## 2020-09-13 PROCEDURE — 99214 OFFICE O/P EST MOD 30 MIN: CPT

## 2020-09-13 PROCEDURE — 99204 OFFICE O/P NEW MOD 45 MIN: CPT

## 2020-09-13 RX ORDER — DIAZEPAM 5 MG/1
5 TABLET ORAL ONCE
Status: COMPLETED | OUTPATIENT
Start: 2020-09-13 | End: 2020-09-13

## 2020-09-13 RX ORDER — KETOROLAC TROMETHAMINE 30 MG/ML
30 INJECTION, SOLUTION INTRAMUSCULAR; INTRAVENOUS ONCE
Status: COMPLETED | OUTPATIENT
Start: 2020-09-13 | End: 2020-09-13

## 2020-09-13 RX ORDER — DIAZEPAM 5 MG/1
5 TABLET ORAL 3 TIMES DAILY PRN
Qty: 20 TABLET | Refills: 0 | Status: SHIPPED | OUTPATIENT
Start: 2020-09-13 | End: 2020-09-20

## 2020-09-13 NOTE — ED PROVIDER NOTES
Patient Seen in: [facility]      History   Patient presents with:  Back Pain    Stated Complaint: BACK PAIN    HPI    45-year-old male presents to the clinic for evaluation of low back pain for 3 hours.   Patient states he was be and Rhythm: Normal rate and regular rhythm. Pulmonary:      Effort: Pulmonary effort is normal.      Breath sounds: Normal breath sounds. Musculoskeletal:      Comments: No C/T/L-spine tenderness.     Pain elicited with flexion and bilateral rotation of Prescribed:  Current Discharge Medication List    START taking these medications    diazepam 5 MG Oral Tab  Take 1 tablet (5 mg total) by mouth 3 (three) times daily as needed for Anxiety.   Qty: 20 tablet Refills: 0

## 2020-09-28 ENCOUNTER — OFFICE VISIT (OUTPATIENT)
Dept: INTERNAL MEDICINE CLINIC | Facility: CLINIC | Age: 50
End: 2020-09-28
Payer: MEDICAID

## 2020-09-28 VITALS
RESPIRATION RATE: 16 BRPM | HEART RATE: 75 BPM | DIASTOLIC BLOOD PRESSURE: 78 MMHG | SYSTOLIC BLOOD PRESSURE: 118 MMHG | TEMPERATURE: 98 F | WEIGHT: 195 LBS | BODY MASS INDEX: 28.23 KG/M2 | HEIGHT: 69.5 IN

## 2020-09-28 DIAGNOSIS — M62.830 LUMBAR PARASPINAL MUSCLE SPASM: ICD-10-CM

## 2020-09-28 DIAGNOSIS — M54.50 ACUTE RIGHT-SIDED LOW BACK PAIN WITHOUT SCIATICA: Primary | ICD-10-CM

## 2020-09-28 PROCEDURE — 3078F DIAST BP <80 MM HG: CPT | Performed by: FAMILY MEDICINE

## 2020-09-28 PROCEDURE — 3074F SYST BP LT 130 MM HG: CPT | Performed by: FAMILY MEDICINE

## 2020-09-28 PROCEDURE — 3008F BODY MASS INDEX DOCD: CPT | Performed by: FAMILY MEDICINE

## 2020-09-28 PROCEDURE — 99213 OFFICE O/P EST LOW 20 MIN: CPT | Performed by: FAMILY MEDICINE

## 2020-09-28 RX ORDER — METHYLPREDNISOLONE 4 MG/1
TABLET ORAL
Qty: 1 KIT | Refills: 0 | Status: SHIPPED | OUTPATIENT
Start: 2020-09-28 | End: 2021-05-28 | Stop reason: ALTCHOICE

## 2020-09-28 RX ORDER — CYCLOBENZAPRINE HCL 10 MG
10 TABLET ORAL 3 TIMES DAILY PRN
Qty: 60 TABLET | Refills: 0 | Status: SHIPPED | OUTPATIENT
Start: 2020-09-28 | End: 2021-05-28 | Stop reason: ALTCHOICE

## 2020-09-28 NOTE — PROGRESS NOTES
Jaylene Womack  10/24/1970    Patient presents with:  Back Pain: DD Rm 9, Low back pain x 2 weeks, muscle relaxers ibuprofen not effective      HPI:   Jaylene Womack is a 52year old male who presents with acute on chronic low back pain.  Patient was No rashes,no suspicious lesions  EYES: EOMI, conjunctiva clear  HEENT: atraumatic, normocephalic  NECK: supple,no adenopathy,no bruits  LUNGS: clear to auscultation  CARDIO: RRR without murmur  GI: good BS's,no masses, HSM or tenderness  MSK: significant R

## 2020-09-29 ENCOUNTER — OFFICE VISIT (OUTPATIENT)
Dept: PHYSICAL THERAPY | Age: 50
End: 2020-09-29
Attending: FAMILY MEDICINE
Payer: MEDICAID

## 2020-09-29 DIAGNOSIS — M54.50 ACUTE RIGHT-SIDED LOW BACK PAIN WITHOUT SCIATICA: ICD-10-CM

## 2020-09-29 PROCEDURE — 97110 THERAPEUTIC EXERCISES: CPT

## 2020-09-29 PROCEDURE — 97014 ELECTRIC STIMULATION THERAPY: CPT

## 2020-09-29 PROCEDURE — 97162 PT EVAL MOD COMPLEX 30 MIN: CPT

## 2020-09-29 NOTE — PROGRESS NOTES
SPINE EVALUATION:   Referring Physician: Dr. Vic Lopes  Diagnosis: Acute low back strain 9/13/2020   Date of Service: 9/29/2020     PATIENT SUMMARY   Gus Guan is a 52year old male who presents to therapy today with complaints of acute onset of LBP o Functional deficits include but are not limited to unable to work as a , difficulty with bending, walking, sitting, turning over in bed, unable to sleep in bed. Signs and symptoms are consistent with diagnosis of acute severe lumbar strain.  Pt prognosis.  Pt was also provided recommendations for modalities as needed [ice/heat] and importance of remaining active  Patient was instructed in and issued a HEP for: Supine heel slides R/L x 10, bent leg lift with abd bracing x 10, walking 1 block 2-3 x

## 2020-10-02 ENCOUNTER — OFFICE VISIT (OUTPATIENT)
Dept: PHYSICAL THERAPY | Age: 50
End: 2020-10-02
Attending: FAMILY MEDICINE
Payer: MEDICAID

## 2020-10-02 PROCEDURE — 97112 NEUROMUSCULAR REEDUCATION: CPT

## 2020-10-02 PROCEDURE — 97110 THERAPEUTIC EXERCISES: CPT

## 2020-10-02 PROCEDURE — 97140 MANUAL THERAPY 1/> REGIONS: CPT
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
## 2020-10-05 ENCOUNTER — OFFICE VISIT (OUTPATIENT)
Dept: PHYSICAL THERAPY | Age: 50
End: 2020-10-05
Attending: FAMILY MEDICINE
Payer: MEDICAID

## 2020-10-05 PROCEDURE — 97110 THERAPEUTIC EXERCISES: CPT

## 2020-10-05 PROCEDURE — 97140 MANUAL THERAPY 1/> REGIONS: CPT
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
## 2020-10-07 ENCOUNTER — TELEPHONE (OUTPATIENT)
Dept: PHYSICAL THERAPY | Age: 50
End: 2020-10-07

## 2020-10-07 ENCOUNTER — APPOINTMENT (OUTPATIENT)
Dept: PHYSICAL THERAPY | Age: 50
End: 2020-10-07
Attending: FAMILY MEDICINE
Payer: MEDICAID

## 2020-10-09 ENCOUNTER — OFFICE VISIT (OUTPATIENT)
Dept: PHYSICAL THERAPY | Age: 50
End: 2020-10-09
Attending: FAMILY MEDICINE
Payer: MEDICAID

## 2020-10-09 PROCEDURE — 97014 ELECTRIC STIMULATION THERAPY: CPT

## 2020-10-09 PROCEDURE — 97140 MANUAL THERAPY 1/> REGIONS: CPT

## 2020-10-09 PROCEDURE — 97110 THERAPEUTIC EXERCISES: CPT
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
## 2020-10-12 ENCOUNTER — OFFICE VISIT (OUTPATIENT)
Dept: PHYSICAL THERAPY | Age: 50
End: 2020-10-12
Attending: FAMILY MEDICINE
Payer: MEDICAID

## 2020-10-12 PROCEDURE — 97110 THERAPEUTIC EXERCISES: CPT

## 2020-10-12 PROCEDURE — 97014 ELECTRIC STIMULATION THERAPY: CPT

## 2020-10-12 PROCEDURE — 97140 MANUAL THERAPY 1/> REGIONS: CPT
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
## 2020-10-13 ENCOUNTER — APPOINTMENT (OUTPATIENT)
Dept: PHYSICAL THERAPY | Age: 50
End: 2020-10-13
Attending: FAMILY MEDICINE
Payer: MEDICAID

## 2020-10-19 ENCOUNTER — APPOINTMENT (OUTPATIENT)
Dept: PHYSICAL THERAPY | Age: 50
End: 2020-10-19
Attending: FAMILY MEDICINE
Payer: MEDICAID

## 2020-10-20 ENCOUNTER — TELEPHONE (OUTPATIENT)
Dept: PHYSICAL THERAPY | Age: 50
End: 2020-10-20

## 2020-10-22 ENCOUNTER — APPOINTMENT (OUTPATIENT)
Dept: PHYSICAL THERAPY | Age: 50
End: 2020-10-22
Attending: FAMILY MEDICINE
Payer: MEDICAID

## 2021-04-13 ENCOUNTER — TELEPHONE (OUTPATIENT)
Dept: INTERNAL MEDICINE CLINIC | Facility: CLINIC | Age: 51
End: 2021-04-13

## 2021-04-13 DIAGNOSIS — Z13.29 SCREENING FOR THYROID DISORDER: ICD-10-CM

## 2021-04-13 DIAGNOSIS — Z13.228 SCREENING FOR ENDOCRINE, METABOLIC AND IMMUNITY DISORDER: ICD-10-CM

## 2021-04-13 DIAGNOSIS — Z00.00 ROUTINE GENERAL MEDICAL EXAMINATION AT A HEALTH CARE FACILITY: Primary | ICD-10-CM

## 2021-04-13 DIAGNOSIS — Z13.220 SCREENING, LIPID: ICD-10-CM

## 2021-04-13 DIAGNOSIS — Z13.0 SCREENING FOR BLOOD DISEASE: ICD-10-CM

## 2021-04-13 DIAGNOSIS — Z13.0 SCREENING FOR ENDOCRINE, METABOLIC AND IMMUNITY DISORDER: ICD-10-CM

## 2021-04-13 DIAGNOSIS — Z13.29 SCREENING FOR ENDOCRINE, METABOLIC AND IMMUNITY DISORDER: ICD-10-CM

## 2021-04-13 NOTE — TELEPHONE ENCOUNTER
Bloodwork orders placed to THE [facility] lab for upcoming physical per protocol.

## 2021-04-13 NOTE — TELEPHONE ENCOUNTER
Future Appointments   Date Time Provider Ilda Mercado   4/26/2021  7:40 AM Dominga Vargas MD [facility]         Orders to  THE [facility]  aware must fast no call back required

## 2021-05-28 ENCOUNTER — LAB ENCOUNTER (OUTPATIENT)
Dept: LAB | Age: 51
End: 2021-05-28
Attending: INTERNAL MEDICINE
Payer: MEDICAID

## 2021-05-28 ENCOUNTER — OFFICE VISIT (OUTPATIENT)
Dept: INTERNAL MEDICINE CLINIC | Facility: CLINIC | Age: 51
End: 2021-05-28
Payer: MEDICAID

## 2021-05-28 VITALS
SYSTOLIC BLOOD PRESSURE: 120 MMHG | TEMPERATURE: 97 F | HEIGHT: 68.5 IN | DIASTOLIC BLOOD PRESSURE: 82 MMHG | WEIGHT: 186 LBS | OXYGEN SATURATION: 98 % | RESPIRATION RATE: 16 BRPM | BODY MASS INDEX: 27.87 KG/M2 | HEART RATE: 64 BPM

## 2021-05-28 DIAGNOSIS — G89.29 CHRONIC LEFT-SIDED LOW BACK PAIN WITH LEFT-SIDED SCIATICA: ICD-10-CM

## 2021-05-28 DIAGNOSIS — Z13.29 SCREENING FOR THYROID DISORDER: ICD-10-CM

## 2021-05-28 DIAGNOSIS — E55.9 VITAMIN D DEFICIENCY: ICD-10-CM

## 2021-05-28 DIAGNOSIS — Z13.220 SCREENING, LIPID: ICD-10-CM

## 2021-05-28 DIAGNOSIS — Z13.0 SCREENING FOR BLOOD DISEASE: ICD-10-CM

## 2021-05-28 DIAGNOSIS — Z12.5 PROSTATE CANCER SCREENING: ICD-10-CM

## 2021-05-28 DIAGNOSIS — Z00.00 ROUTINE GENERAL MEDICAL EXAMINATION AT A HEALTH CARE FACILITY: ICD-10-CM

## 2021-05-28 DIAGNOSIS — M54.42 CHRONIC LEFT-SIDED LOW BACK PAIN WITH LEFT-SIDED SCIATICA: ICD-10-CM

## 2021-05-28 DIAGNOSIS — Z98.1 HISTORY OF SPINAL FUSION: ICD-10-CM

## 2021-05-28 DIAGNOSIS — Z13.228 SCREENING FOR ENDOCRINE, METABOLIC AND IMMUNITY DISORDER: ICD-10-CM

## 2021-05-28 DIAGNOSIS — Z13.29 SCREENING FOR ENDOCRINE, METABOLIC AND IMMUNITY DISORDER: ICD-10-CM

## 2021-05-28 DIAGNOSIS — Z00.00 ROUTINE GENERAL MEDICAL EXAMINATION AT A HEALTH CARE FACILITY: Primary | ICD-10-CM

## 2021-05-28 DIAGNOSIS — M51.36 DDD (DEGENERATIVE DISC DISEASE), LUMBAR: ICD-10-CM

## 2021-05-28 DIAGNOSIS — Z13.0 SCREENING FOR ENDOCRINE, METABOLIC AND IMMUNITY DISORDER: ICD-10-CM

## 2021-05-28 DIAGNOSIS — Z12.11 SCREEN FOR COLON CANCER: ICD-10-CM

## 2021-05-28 PROCEDURE — 99396 PREV VISIT EST AGE 40-64: CPT | Performed by: INTERNAL MEDICINE

## 2021-05-28 PROCEDURE — 3074F SYST BP LT 130 MM HG: CPT | Performed by: INTERNAL MEDICINE

## 2021-05-28 PROCEDURE — 82306 VITAMIN D 25 HYDROXY: CPT

## 2021-05-28 PROCEDURE — 80061 LIPID PANEL: CPT

## 2021-05-28 PROCEDURE — 85025 COMPLETE CBC W/AUTO DIFF WBC: CPT

## 2021-05-28 PROCEDURE — 80053 COMPREHEN METABOLIC PANEL: CPT

## 2021-05-28 PROCEDURE — 84443 ASSAY THYROID STIM HORMONE: CPT

## 2021-05-28 PROCEDURE — 3008F BODY MASS INDEX DOCD: CPT | Performed by: INTERNAL MEDICINE

## 2021-05-28 PROCEDURE — 3079F DIAST BP 80-89 MM HG: CPT | Performed by: INTERNAL MEDICINE

## 2021-05-28 PROCEDURE — 36415 COLL VENOUS BLD VENIPUNCTURE: CPT

## 2021-05-28 NOTE — PROGRESS NOTES
Tamiko Be  10/24/1970    Patient presents with:  Physical: RG rm 6 Physical      HPI:   Tamiko Be is a 48year old male who presents for an annual physical examination.     The patient notes continued symptoms of an intermittent left lower b nourished,in no apparent distress  SKIN: No rashes,no suspicious lesions  EYES: Bilateral conjunctiva are clear  HEENT: atraumatic, normocephalic. Tympanic membrane within normal limits bilaterally.   NECK: supple,no adenopathy,no bruits  LUNGS: clear to a

## 2021-06-03 ENCOUNTER — LAB ENCOUNTER (OUTPATIENT)
Dept: LAB | Facility: HOSPITAL | Age: 51
End: 2021-06-03
Attending: INTERNAL MEDICINE
Payer: MEDICAID

## 2021-06-03 DIAGNOSIS — Z12.11 SCREEN FOR COLON CANCER: ICD-10-CM

## 2021-06-08 PROCEDURE — 82274 ASSAY TEST FOR BLOOD FECAL: CPT

## 2021-08-06 NOTE — TELEPHONE ENCOUNTER
Message sent to patient via [address]
Patient was not seen in our office until 4/13/2017 and did not request a note at that visit. I did not see him until 5/18, and I wrote him a note at that visit. It is not appropriate for me to write him a note to be off work since March.
Spoke with patient who stated insurance will not accept the work note that was written on 5/18/17 as he states letter needs to say patient is unable to work. Informed patient that those work notes are for work not for his [employer].  If his insuranc
07:18

## 2021-11-24 ENCOUNTER — HOSPITAL ENCOUNTER (OUTPATIENT)
Age: 51
Discharge: HOME | End: 2021-11-24
Payer: MEDICAID

## 2021-11-24 VITALS
SYSTOLIC BLOOD PRESSURE: 109 MMHG | RESPIRATION RATE: 16 BRPM | OXYGEN SATURATION: 100 % | HEART RATE: 65 BPM | WEIGHT: 185 LBS | TEMPERATURE: 98 F | BODY MASS INDEX: 27.4 KG/M2 | HEIGHT: 69 IN | DIASTOLIC BLOOD PRESSURE: 66 MMHG

## 2021-11-24 DIAGNOSIS — U07.1 COVID: Primary | ICD-10-CM

## 2021-11-24 PROCEDURE — 99213 OFFICE O/P EST LOW 20 MIN: CPT

## 2021-11-24 NOTE — ED PROVIDER NOTES
Patient Seen in: [facility]      History   Patient presents with:  Cough  Covid-19 Test    Stated Complaint: covid    Subjective: This is a 70-year-old male with no significant past medical history.   Presents to immediate care for cough 97.6 °F (36.4 °C)   Temp src Temporal   SpO2 100 %   O2 Device None (Room air)       Current:/66   Pulse 65   Temp 97.6 °F (36.4 °C) (Temporal)   Resp 16   Ht 175.3 cm (5' 9\")   Wt 83.9 kg   SpO2 100%   BMI 27.32 kg/m²         Physical Exam  Vitals Detected (*)     All other components within normal limits          Rapid covid. MDM      Patient's vital signs are stable, nontoxic and well-appearing. Presents to immediate care for Covid symptoms. Rapid Covid is positive.   Lung sounds clear bi

## 2021-11-24 NOTE — ED INITIAL ASSESSMENT (HPI)
Patient presents to IC with c/o +covid exposure(wife)and he started having symptoms 4 days ago of headache,dry cough,back/neck pain. No fever in clinic. NOT vaccinated.

## 2021-11-24 NOTE — ED QUICK NOTES
Patient has concerns about Casirivimab/Imdevimab. RN had provider come into room and speak to patient  Patient states refuses treatment and will go home and treat with symptomatic OTC care.

## 2021-11-28 ENCOUNTER — HOSPITAL ENCOUNTER (OUTPATIENT)
Age: 51
Discharge: HOME | End: 2021-11-28
Payer: MEDICAID

## 2021-11-28 ENCOUNTER — APPOINTMENT (OUTPATIENT)
Dept: GENERAL RADIOLOGY | Age: 51
End: 2021-11-28
Attending: NURSE PRACTITIONER
Payer: MEDICAID

## 2021-11-28 VITALS
RESPIRATION RATE: 20 BRPM | OXYGEN SATURATION: 98 % | WEIGHT: 155 LBS | SYSTOLIC BLOOD PRESSURE: 152 MMHG | BODY MASS INDEX: 22.96 KG/M2 | HEART RATE: 79 BPM | DIASTOLIC BLOOD PRESSURE: 70 MMHG | TEMPERATURE: 99 F | HEIGHT: 69 IN

## 2021-11-28 DIAGNOSIS — J12.82 PNEUMONIA DUE TO COVID-19 VIRUS: Primary | ICD-10-CM

## 2021-11-28 DIAGNOSIS — U07.1 PNEUMONIA DUE TO COVID-19 VIRUS: Primary | ICD-10-CM

## 2021-11-28 PROCEDURE — 99214 OFFICE O/P EST MOD 30 MIN: CPT

## 2021-11-28 PROCEDURE — 99215 OFFICE O/P EST HI 40 MIN: CPT

## 2021-11-28 PROCEDURE — 71046 X-RAY EXAM CHEST 2 VIEWS: CPT | Performed by: NURSE PRACTITIONER

## 2021-11-28 RX ORDER — ALBUTEROL SULFATE 90 UG/1
2 AEROSOL, METERED RESPIRATORY (INHALATION) EVERY 4 HOURS PRN
Qty: 1 EACH | Refills: 0 | Status: SHIPPED | OUTPATIENT
Start: 2021-11-28 | End: 2021-12-28

## 2021-11-28 RX ORDER — ALBUTEROL SULFATE 90 UG/1
2 AEROSOL, METERED RESPIRATORY (INHALATION) EVERY 4 HOURS PRN
Qty: 1 EACH | Refills: 0 | Status: SHIPPED | OUTPATIENT
Start: 2021-11-28 | End: 2021-11-28

## 2021-11-28 RX ORDER — ACETAMINOPHEN 500 MG
1000 TABLET ORAL ONCE
Status: COMPLETED | OUTPATIENT
Start: 2021-11-28 | End: 2021-11-28

## 2021-11-28 NOTE — ED PROVIDER NOTES
Patient Seen in: [facility]      History   Patient presents with:  Covid    Stated Complaint: covid    Subjective:   HPI    Patient presents to the urgent care with report of having been here on Wednesday and diagnosed with Covid.   Nasrin covid  Other systems are as noted in HPI. Constitutional and vital signs reviewed. All other systems reviewed and negative except as noted above.     Physical Exam     ED Triage Vitals [11/28/21 1216]   /70   Pulse 79   Resp 20   Temp 99.2 °F (3 and lateral chest radiographs were obtained. PATIENT STATED HISTORY: (As transcribed by Technologist)  Worsening cough. Positive COVID 19 test 11/24/2021. Low grade fever.     FINDINGS:  LUNGS:  There is very mild patchy ground-glass opacity within the sup Disposition:  Discharge  11/28/2021  1:46 pm    Follow-up:  Mello Zuniga MD  [address] [facility],[place] [address]  [phone]    Schedule an appointment as soon as possible for a visit   As needed, If symptoms worsen          Medications Pre

## 2021-11-29 ENCOUNTER — TELEPHONE (OUTPATIENT)
Dept: INTERNAL MEDICINE CLINIC | Facility: CLINIC | Age: 51
End: 2021-11-29

## 2021-11-29 NOTE — TELEPHONE ENCOUNTER
Pt stated he was diagnosed with Covid on 11/20 and was diagnosed with pneumonia yesterday and is coughing alot. Pt needs a follow up televisit. Please advise what hold spot I can take.

## 2021-11-30 NOTE — TELEPHONE ENCOUNTER
Future Appointments   Date Time Provider Ilda Jess   12/1/2021  9:00 AM Kaylyn De Oliveira MD [facility]

## 2021-12-01 ENCOUNTER — TELEMEDICINE (OUTPATIENT)
Dept: INTERNAL MEDICINE CLINIC | Facility: CLINIC | Age: 51
End: 2021-12-01
Payer: MEDICAID

## 2021-12-01 DIAGNOSIS — U07.1 PNEUMONIA DUE TO COVID-19 VIRUS: Primary | ICD-10-CM

## 2021-12-01 DIAGNOSIS — J12.82 PNEUMONIA DUE TO COVID-19 VIRUS: Primary | ICD-10-CM

## 2021-12-01 PROCEDURE — 99213 OFFICE O/P EST LOW 20 MIN: CPT | Performed by: INTERNAL MEDICINE

## 2021-12-01 RX ORDER — CODEINE PHOSPHATE AND GUAIFENESIN 10; 100 MG/5ML; MG/5ML
5 SOLUTION ORAL EVERY EVENING
Qty: 35 ML | Refills: 0 | Status: SHIPPED | OUTPATIENT
Start: 2021-12-01 | End: 2021-12-08

## 2021-12-01 RX ORDER — BENZONATATE 100 MG/1
100 CAPSULE ORAL 3 TIMES DAILY PRN
Qty: 21 CAPSULE | Refills: 0 | Status: SHIPPED | OUTPATIENT
Start: 2021-12-01 | End: 2021-12-08

## 2021-12-01 NOTE — PROGRESS NOTES
Ken Ramos  10/24/1970    No chief complaint on file. Video encounter    SUBJECTIVE   Theresa Lewis is a 46year old male with COVID19 infection who presents as a follow-up.     The patient began experiencing symptoms suggestive of COVID19 on 11 CXR evaluation    Time spent: 20 minutes    The patient indicates understanding of these issues and agrees to the plan. TODAY'S ORDERS     No orders of the defined types were placed in this encounter.       Meds & Refills:  Requested Prescriptions      N

## 2022-03-10 ENCOUNTER — TELEPHONE (OUTPATIENT)
Dept: INTERNAL MEDICINE CLINIC | Facility: CLINIC | Age: 52
End: 2022-03-10

## 2022-03-10 DIAGNOSIS — Z12.11 COLON CANCER SCREENING: Primary | ICD-10-CM

## 2022-03-18 ENCOUNTER — TELEPHONE (OUTPATIENT)
Dept: INTERNAL MEDICINE CLINIC | Facility: CLINIC | Age: 52
End: 2022-03-18

## 2022-03-18 DIAGNOSIS — Z12.5 SCREENING FOR MALIGNANT NEOPLASM OF PROSTATE: ICD-10-CM

## 2022-03-18 DIAGNOSIS — Z00.00 ROUTINE GENERAL MEDICAL EXAMINATION AT A HEALTH CARE FACILITY: Primary | ICD-10-CM

## 2022-03-18 DIAGNOSIS — Z13.0 SCREENING FOR DISORDER OF BLOOD AND BLOOD-FORMING ORGANS: ICD-10-CM

## 2022-03-18 DIAGNOSIS — Z13.29 SCREENING FOR THYROID DISORDER: ICD-10-CM

## 2022-03-18 DIAGNOSIS — E55.9 VITAMIN D DEFICIENCY: ICD-10-CM

## 2022-03-18 DIAGNOSIS — Z13.220 SCREENING FOR LIPID DISORDERS: ICD-10-CM

## 2022-03-18 DIAGNOSIS — Z13.228 SCREENING FOR METABOLIC DISORDER: ICD-10-CM

## 2022-03-18 NOTE — TELEPHONE ENCOUNTER
Orders to [facility]  Pt aware to get labs done no sooner than 2 weeks prior to the appt. Pt aware to fast.  No call back required.   Future Appointments   Date Time Provider Ilda Mercado   6/6/2022  9:40 AM Kaylin Garnica MD [facility]

## 2022-05-15 ENCOUNTER — HOSPITAL ENCOUNTER (OUTPATIENT)
Age: 52
Discharge: HOME | End: 2022-05-15
Payer: MEDICAID

## 2022-05-15 VITALS
HEIGHT: 69 IN | DIASTOLIC BLOOD PRESSURE: 71 MMHG | RESPIRATION RATE: 20 BRPM | OXYGEN SATURATION: 98 % | BODY MASS INDEX: 27.4 KG/M2 | SYSTOLIC BLOOD PRESSURE: 122 MMHG | WEIGHT: 185 LBS | TEMPERATURE: 97 F | HEART RATE: 64 BPM

## 2022-05-15 DIAGNOSIS — K04.7 DENTAL ABSCESS: Primary | ICD-10-CM

## 2022-05-15 PROCEDURE — 99213 OFFICE O/P EST LOW 20 MIN: CPT

## 2022-05-15 RX ORDER — AMOXICILLIN AND CLAVULANATE POTASSIUM 875; 125 MG/1; MG/1
1 TABLET, FILM COATED ORAL 2 TIMES DAILY
Qty: 20 TABLET | Refills: 0 | Status: SHIPPED | OUTPATIENT
Start: 2022-05-15 | End: 2022-05-15

## 2022-05-15 RX ORDER — IBUPROFEN 600 MG/1
600 TABLET ORAL EVERY 8 HOURS PRN
Qty: 30 TABLET | Refills: 0 | Status: SHIPPED | OUTPATIENT
Start: 2022-05-15 | End: 2022-05-22

## 2022-05-15 RX ORDER — CLINDAMYCIN HYDROCHLORIDE 300 MG/1
300 CAPSULE ORAL 3 TIMES DAILY
Qty: 30 CAPSULE | Refills: 0 | Status: SHIPPED | OUTPATIENT
Start: 2022-05-15 | End: 2022-05-25

## 2022-05-18 ENCOUNTER — TELEPHONE (OUTPATIENT)
Dept: INTERNAL MEDICINE CLINIC | Facility: CLINIC | Age: 52
End: 2022-05-18

## 2022-05-18 NOTE — TELEPHONE ENCOUNTER
Agree, antibiotics for entire 10 days, as prescribed. This is the best course of action: antibiotics and contacting the dental service for further evaluation and management. Agree with IC/ED warnings.

## 2022-05-18 NOTE — TELEPHONE ENCOUNTER
Patient was seen in ER on Sunday for tooth pain. He was given amoxicillin and he started the medication on Sunday and also is taking ibuporfin . Patient is still not feeling. How long does he take the medication?

## 2022-05-18 NOTE — TELEPHONE ENCOUNTER
Telemed 12/1/21    Pt reports went to IC 5/15. Was given ibuprofen and Augmentin for dental abscess. Wants to know how long to take abx and states is painful and possibly a little more swollen in mouth around tooth today. Advised will need to continue abx until complete unless told otherwise. Denies fever. Pt requests we ask AD what he should do. Advised of IC/ED warnings and that IC advised him to f/u with his dentist. Informed to call dentist ASAP to schedule f/u. Agreeable, but unsure that he is actually going to do this. Pt aware if any worse this evening or develops fever- needs to return to IC/ED. Verbs understanding and then sounds to downplay sx. Aware call back will be tomorrow.

## 2022-05-20 ENCOUNTER — HOSPITAL ENCOUNTER (OUTPATIENT)
Age: 52
Discharge: HOME | End: 2022-05-20
Attending: EMERGENCY MEDICINE
Payer: MEDICAID

## 2022-05-20 VITALS
TEMPERATURE: 98 F | DIASTOLIC BLOOD PRESSURE: 87 MMHG | HEART RATE: 61 BPM | RESPIRATION RATE: 16 BRPM | OXYGEN SATURATION: 98 % | SYSTOLIC BLOOD PRESSURE: 109 MMHG

## 2022-05-20 DIAGNOSIS — K04.7 DENTAL ABSCESS: Primary | ICD-10-CM

## 2022-05-20 PROCEDURE — 99212 OFFICE O/P EST SF 10 MIN: CPT

## 2022-05-20 NOTE — ED INITIAL ASSESSMENT (HPI)
Pt began with a tooth infection on the rt side, causing his jaw and face to swelll,  that began 8 days ago  Was seen 6 days ago and given antibiotic.   Has been taking amoxicillin, and states that he is still having pain and swelling, and now also has some pain and swelling to his left hand and leg, not sure if it;s related

## 2022-06-04 LAB
ABSOLUTE BASOPHILS: 78 CELLS/UL (ref 0–200)
ABSOLUTE EOSINOPHILS: 98 CELLS/UL (ref 15–500)
ABSOLUTE LYMPHOCYTES: 2932 CELLS/UL (ref 850–3900)
ABSOLUTE MONOCYTES: 338 CELLS/UL (ref 200–950)
ABSOLUTE NEUTROPHILS: 3055 CELLS/UL (ref 1500–7800)
ALBUMIN/GLOBULIN RATIO: 2 (CALC) (ref 1–2.5)
ALBUMIN: 4.9 G/DL (ref 3.6–5.1)
ALKALINE PHOSPHATASE: 54 U/L (ref 35–144)
ALT: 27 U/L (ref 9–46)
AST: 17 U/L (ref 10–35)
BASOPHILS: 1.2 %
BILIRUBIN, TOTAL: 0.5 MG/DL (ref 0.2–1.2)
BUN: 14 MG/DL (ref 7–25)
CALCIUM: 9.8 MG/DL (ref 8.6–10.3)
CARBON DIOXIDE: 28 MMOL/L (ref 20–32)
CHLORIDE: 104 MMOL/L (ref 98–110)
CHOL/HDLC RATIO: 4.7 (CALC)
CHOLESTEROL, TOTAL: 229 MG/DL
CREATININE: 1.04 MG/DL (ref 0.7–1.33)
EGFR IF AFRICN AM: 96 ML/MIN/1.73M2
EGFR IF NONAFRICN AM: 83 ML/MIN/1.73M2
EOSINOPHILS: 1.5 %
GLOBULIN: 2.4 G/DL (CALC) (ref 1.9–3.7)
GLUCOSE: 81 MG/DL (ref 65–99)
HDL CHOLESTEROL: 49 MG/DL
HEMATOCRIT: 45.1 % (ref 38.5–50)
HEMOGLOBIN: 15.2 G/DL (ref 13.2–17.1)
LDL-CHOLESTEROL: 158 MG/DL (CALC)
LYMPHOCYTES: 45.1 %
MCH: 30.4 PG (ref 27–33)
MCHC: 33.7 G/DL (ref 32–36)
MCV: 90.2 FL (ref 80–100)
MONOCYTES: 5.2 %
MPV: 11.1 FL (ref 7.5–12.5)
NEUTROPHILS: 47 %
NON-HDL CHOLESTEROL: 180 MG/DL (CALC)
PLATELET COUNT: 196 THOUSAND/UL (ref 140–400)
POTASSIUM: 4.1 MMOL/L (ref 3.5–5.3)
PROTEIN, TOTAL: 7.3 G/DL (ref 6.1–8.1)
RDW: 12.3 % (ref 11–15)
RED BLOOD CELL COUNT: 5 MILLION/UL (ref 4.2–5.8)
SODIUM: 139 MMOL/L (ref 135–146)
TRIGLYCERIDES: 104 MG/DL
TSH W/REFLEX TO FT4: 2.84 MIU/L (ref 0.4–4.5)
VITAMIN D, 25-OH, TOTAL: 37 NG/ML (ref 30–100)
WHITE BLOOD CELL COUNT: 6.5 THOUSAND/UL (ref 3.8–10.8)

## 2022-06-06 ENCOUNTER — OFFICE VISIT (OUTPATIENT)
Dept: INTERNAL MEDICINE CLINIC | Facility: CLINIC | Age: 52
End: 2022-06-06
Payer: MEDICAID

## 2022-06-06 VITALS
DIASTOLIC BLOOD PRESSURE: 82 MMHG | HEART RATE: 62 BPM | SYSTOLIC BLOOD PRESSURE: 118 MMHG | HEIGHT: 69.69 IN | OXYGEN SATURATION: 98 % | BODY MASS INDEX: 26.93 KG/M2 | WEIGHT: 186 LBS | RESPIRATION RATE: 16 BRPM | TEMPERATURE: 97 F

## 2022-06-06 DIAGNOSIS — Z98.1 HISTORY OF SPINAL FUSION: ICD-10-CM

## 2022-06-06 DIAGNOSIS — Z00.00 ROUTINE GENERAL MEDICAL EXAMINATION AT A HEALTH CARE FACILITY: Primary | ICD-10-CM

## 2022-06-06 DIAGNOSIS — Z12.11 SCREEN FOR COLON CANCER: ICD-10-CM

## 2022-06-06 DIAGNOSIS — M51.36 DDD (DEGENERATIVE DISC DISEASE), LUMBAR: ICD-10-CM

## 2022-06-06 DIAGNOSIS — E78.00 PURE HYPERCHOLESTEROLEMIA: ICD-10-CM

## 2022-06-06 PROBLEM — M51.369 DDD (DEGENERATIVE DISC DISEASE), LUMBAR: Status: ACTIVE | Noted: 2022-06-06

## 2022-06-06 PROCEDURE — 3008F BODY MASS INDEX DOCD: CPT | Performed by: INTERNAL MEDICINE

## 2022-06-06 PROCEDURE — 99396 PREV VISIT EST AGE 40-64: CPT | Performed by: INTERNAL MEDICINE

## 2022-06-06 PROCEDURE — 3074F SYST BP LT 130 MM HG: CPT | Performed by: INTERNAL MEDICINE

## 2022-06-06 PROCEDURE — 3079F DIAST BP 80-89 MM HG: CPT | Performed by: INTERNAL MEDICINE

## 2022-07-09 ENCOUNTER — HOSPITAL ENCOUNTER (OUTPATIENT)
Age: 52
Discharge: HOME | End: 2022-07-09
Payer: MEDICAID

## 2022-07-09 ENCOUNTER — APPOINTMENT (OUTPATIENT)
Dept: GENERAL RADIOLOGY | Age: 52
End: 2022-07-09
Attending: PHYSICIAN ASSISTANT
Payer: MEDICAID

## 2022-07-09 VITALS
OXYGEN SATURATION: 99 % | RESPIRATION RATE: 16 BRPM | HEART RATE: 60 BPM | SYSTOLIC BLOOD PRESSURE: 118 MMHG | DIASTOLIC BLOOD PRESSURE: 75 MMHG | TEMPERATURE: 99 F

## 2022-07-09 DIAGNOSIS — S90.32XA CONTUSION OF LEFT FOOT, INITIAL ENCOUNTER: Primary | ICD-10-CM

## 2022-07-09 DIAGNOSIS — S93.602A FOOT SPRAIN, LEFT, INITIAL ENCOUNTER: ICD-10-CM

## 2022-07-09 PROCEDURE — L3260 AMBULATORY SURGICAL BOOT EAC: HCPCS | Performed by: PHYSICIAN ASSISTANT

## 2022-07-09 PROCEDURE — A6449 LT COMPRES BAND >=3" <5"/YD: HCPCS | Performed by: PHYSICIAN ASSISTANT

## 2022-07-09 PROCEDURE — 99203 OFFICE O/P NEW LOW 30 MIN: CPT | Performed by: PHYSICIAN ASSISTANT

## 2022-07-09 PROCEDURE — 73630 X-RAY EXAM OF FOOT: CPT | Performed by: PHYSICIAN ASSISTANT

## 2022-10-11 ENCOUNTER — WALK IN (OUTPATIENT)
Dept: URGENT CARE | Age: 52
End: 2022-10-11

## 2022-10-11 VITALS
RESPIRATION RATE: 18 BRPM | OXYGEN SATURATION: 98 % | HEART RATE: 66 BPM | DIASTOLIC BLOOD PRESSURE: 80 MMHG | TEMPERATURE: 98.4 F | SYSTOLIC BLOOD PRESSURE: 137 MMHG

## 2022-10-11 DIAGNOSIS — J21.0 RSV (ACUTE BRONCHIOLITIS DUE TO RESPIRATORY SYNCYTIAL VIRUS): Primary | ICD-10-CM

## 2022-10-11 DIAGNOSIS — J02.9 SORE THROAT: ICD-10-CM

## 2022-10-11 LAB
FLUAV RNA RESP QL NAA+PROBE: NOT DETECTED
FLUBV RNA RESP QL NAA+PROBE: NOT DETECTED
INTERNAL PROCEDURAL CONTROLS ACCEPTABLE: YES
RSV AG NPH QL IA.RAPID: DETECTED
S PYO AG THROAT QL IA.RAPID: NEGATIVE
SARS-COV-2 RNA RESP QL NAA+PROBE: NOT DETECTED

## 2022-10-11 PROCEDURE — 87880 STREP A ASSAY W/OPTIC: CPT | Performed by: NURSE PRACTITIONER

## 2022-10-11 PROCEDURE — 0241U POCT COVID/FLU/RSV PANEL: CPT | Performed by: NURSE PRACTITIONER

## 2022-10-11 PROCEDURE — 99203 OFFICE O/P NEW LOW 30 MIN: CPT | Performed by: NURSE PRACTITIONER

## 2022-10-11 ASSESSMENT — ENCOUNTER SYMPTOMS
PSYCHIATRIC NEGATIVE: 1
COUGH: 1
SORE THROAT: 1
CONSTITUTIONAL NEGATIVE: 1
GASTROINTESTINAL NEGATIVE: 1
HEADACHES: 1
EYES NEGATIVE: 1

## 2023-01-27 ENCOUNTER — TELEPHONE (OUTPATIENT)
Dept: INTERNAL MEDICINE CLINIC | Facility: CLINIC | Age: 53
End: 2023-01-27

## 2023-01-27 NOTE — TELEPHONE ENCOUNTER
LEVON received from [employer]. Form sent to scan stat for further processing and a copy of form sent to scanning.

## 2023-02-08 ENCOUNTER — TELEPHONE (OUTPATIENT)
Dept: INTERNAL MEDICINE CLINIC | Facility: CLINIC | Age: 53
End: 2023-02-08

## 2023-02-08 DIAGNOSIS — Z00.00 ROUTINE GENERAL MEDICAL EXAMINATION AT A HEALTH CARE FACILITY: Primary | ICD-10-CM

## 2023-02-08 DIAGNOSIS — Z13.0 SCREENING FOR DISORDER OF BLOOD AND BLOOD-FORMING ORGANS: ICD-10-CM

## 2023-02-08 DIAGNOSIS — Z13.29 SCREENING FOR THYROID DISORDER: ICD-10-CM

## 2023-02-08 DIAGNOSIS — Z13.220 SCREENING FOR LIPID DISORDERS: ICD-10-CM

## 2023-02-08 DIAGNOSIS — Z13.228 SCREENING FOR METABOLIC DISORDER: ICD-10-CM

## 2023-02-08 NOTE — TELEPHONE ENCOUNTER
CPE   Future Appointments   Date Time Provider Ilda Mercado   6/9/2023  7:20 AM Kaylin Garnica MD [facility]      Informed must fast no call back required.  Orders to    [facility]

## 2023-03-22 ENCOUNTER — TELEPHONE (OUTPATIENT)
Dept: SURGERY | Facility: CLINIC | Age: 53
End: 2023-03-22

## 2023-06-05 ENCOUNTER — LAB ENCOUNTER (OUTPATIENT)
Dept: LAB | Age: 53
End: 2023-06-05
Attending: INTERNAL MEDICINE
Payer: MEDICAID

## 2023-06-05 LAB
ALBUMIN SERPL-MCNC: 4.2 G/DL (ref 3.4–5)
ALBUMIN/GLOB SERPL: 1.3 {RATIO} (ref 1–2)
ALP LIVER SERPL-CCNC: 59 U/L
ALT SERPL-CCNC: 40 U/L
ANION GAP SERPL CALC-SCNC: 6 MMOL/L (ref 0–18)
AST SERPL-CCNC: 21 U/L (ref 15–37)
BASOPHILS # BLD AUTO: 0.05 X10(3) UL (ref 0–0.2)
BASOPHILS NFR BLD AUTO: 0.7 %
BILIRUB SERPL-MCNC: 0.5 MG/DL (ref 0.1–2)
BUN BLD-MCNC: 17 MG/DL (ref 7–18)
CALCIUM BLD-MCNC: 9 MG/DL (ref 8.5–10.1)
CHLORIDE SERPL-SCNC: 107 MMOL/L (ref 98–112)
CHOLEST SERPL-MCNC: 205 MG/DL (ref ?–200)
CO2 SERPL-SCNC: 25 MMOL/L (ref 21–32)
CREAT BLD-MCNC: 0.98 MG/DL
EOSINOPHIL # BLD AUTO: 0.19 X10(3) UL (ref 0–0.7)
EOSINOPHIL NFR BLD AUTO: 2.7 %
ERYTHROCYTE [DISTWIDTH] IN BLOOD BY AUTOMATED COUNT: 12.2 %
FASTING PATIENT LIPID ANSWER: YES
FASTING STATUS PATIENT QL REPORTED: YES
GFR SERPLBLD BASED ON 1.73 SQ M-ARVRAT: 93 ML/MIN/1.73M2 (ref 60–?)
GLOBULIN PLAS-MCNC: 3.2 G/DL (ref 2.8–4.4)
GLUCOSE BLD-MCNC: 97 MG/DL (ref 70–99)
HCT VFR BLD AUTO: 45.8 %
HDLC SERPL-MCNC: 50 MG/DL (ref 40–59)
HGB BLD-MCNC: 15.1 G/DL
IMM GRANULOCYTES # BLD AUTO: 0.02 X10(3) UL (ref 0–1)
IMM GRANULOCYTES NFR BLD: 0.3 %
LDLC SERPL CALC-MCNC: 136 MG/DL (ref ?–100)
LYMPHOCYTES # BLD AUTO: 3.34 X10(3) UL (ref 1–4)
LYMPHOCYTES NFR BLD AUTO: 47.4 %
MCH RBC QN AUTO: 30.2 PG (ref 26–34)
MCHC RBC AUTO-ENTMCNC: 33 G/DL (ref 31–37)
MCV RBC AUTO: 91.6 FL
MONOCYTES # BLD AUTO: 0.38 X10(3) UL (ref 0.1–1)
MONOCYTES NFR BLD AUTO: 5.4 %
NEUTROPHILS # BLD AUTO: 3.07 X10 (3) UL (ref 1.5–7.7)
NEUTROPHILS # BLD AUTO: 3.07 X10(3) UL (ref 1.5–7.7)
NEUTROPHILS NFR BLD AUTO: 43.5 %
NONHDLC SERPL-MCNC: 155 MG/DL (ref ?–130)
OSMOLALITY SERPL CALC.SUM OF ELEC: 287 MOSM/KG (ref 275–295)
PLATELET # BLD AUTO: 195 10(3)UL (ref 150–450)
POTASSIUM SERPL-SCNC: 3.9 MMOL/L (ref 3.5–5.1)
PROT SERPL-MCNC: 7.4 G/DL (ref 6.4–8.2)
RBC # BLD AUTO: 5 X10(6)UL
SODIUM SERPL-SCNC: 138 MMOL/L (ref 136–145)
TRIGL SERPL-MCNC: 107 MG/DL (ref 30–149)
TSI SER-ACNC: 2 MIU/ML (ref 0.36–3.74)
VLDLC SERPL CALC-MCNC: 20 MG/DL (ref 0–30)
WBC # BLD AUTO: 7.1 X10(3) UL (ref 4–11)

## 2023-06-05 PROCEDURE — 84443 ASSAY THYROID STIM HORMONE: CPT | Performed by: INTERNAL MEDICINE

## 2023-06-05 PROCEDURE — 85025 COMPLETE CBC W/AUTO DIFF WBC: CPT | Performed by: INTERNAL MEDICINE

## 2023-06-05 PROCEDURE — 80061 LIPID PANEL: CPT | Performed by: INTERNAL MEDICINE

## 2023-06-05 PROCEDURE — 80053 COMPREHEN METABOLIC PANEL: CPT | Performed by: INTERNAL MEDICINE

## 2023-06-09 ENCOUNTER — OFFICE VISIT (OUTPATIENT)
Dept: INTERNAL MEDICINE CLINIC | Facility: CLINIC | Age: 53
End: 2023-06-09
Payer: MEDICAID

## 2023-06-09 VITALS
BODY MASS INDEX: 27.82 KG/M2 | HEART RATE: 55 BPM | HEIGHT: 69 IN | SYSTOLIC BLOOD PRESSURE: 114 MMHG | OXYGEN SATURATION: 98 % | WEIGHT: 187.81 LBS | DIASTOLIC BLOOD PRESSURE: 82 MMHG

## 2023-06-09 DIAGNOSIS — Z00.00 ANNUAL PHYSICAL EXAM: Primary | ICD-10-CM

## 2023-06-09 DIAGNOSIS — E55.9 VITAMIN D DEFICIENCY: ICD-10-CM

## 2023-06-09 DIAGNOSIS — M51.36 DDD (DEGENERATIVE DISC DISEASE), LUMBAR: ICD-10-CM

## 2023-06-09 DIAGNOSIS — Z98.1 HISTORY OF SPINAL FUSION: ICD-10-CM

## 2023-06-09 DIAGNOSIS — E78.00 PURE HYPERCHOLESTEROLEMIA: ICD-10-CM

## 2023-06-09 DIAGNOSIS — Z12.5 PROSTATE CANCER SCREENING: ICD-10-CM

## 2023-06-09 DIAGNOSIS — Z12.11 SCREEN FOR COLON CANCER: ICD-10-CM

## 2023-06-09 PROCEDURE — 3079F DIAST BP 80-89 MM HG: CPT | Performed by: INTERNAL MEDICINE

## 2023-06-09 PROCEDURE — 3008F BODY MASS INDEX DOCD: CPT | Performed by: INTERNAL MEDICINE

## 2023-06-09 PROCEDURE — 99396 PREV VISIT EST AGE 40-64: CPT | Performed by: INTERNAL MEDICINE

## 2023-06-09 PROCEDURE — 3074F SYST BP LT 130 MM HG: CPT | Performed by: INTERNAL MEDICINE

## 2023-06-09 RX ORDER — MELOXICAM 15 MG/1
15 TABLET ORAL DAILY PRN
Qty: 30 TABLET | Refills: 0 | Status: SHIPPED | OUTPATIENT
Start: 2023-06-09

## 2023-06-25 ENCOUNTER — HOSPITAL ENCOUNTER (OUTPATIENT)
Age: 53
Discharge: HOME | End: 2023-06-25
Attending: EMERGENCY MEDICINE
Payer: MEDICAID

## 2023-06-25 VITALS
TEMPERATURE: 98 F | WEIGHT: 182 LBS | DIASTOLIC BLOOD PRESSURE: 58 MMHG | OXYGEN SATURATION: 98 % | RESPIRATION RATE: 18 BRPM | BODY MASS INDEX: 26.96 KG/M2 | SYSTOLIC BLOOD PRESSURE: 124 MMHG | HEIGHT: 69 IN | HEART RATE: 58 BPM

## 2023-06-25 DIAGNOSIS — M54.9 BACK PAIN WITHOUT RADIATION: Primary | ICD-10-CM

## 2023-06-25 PROCEDURE — 99214 OFFICE O/P EST MOD 30 MIN: CPT

## 2023-06-25 PROCEDURE — 99213 OFFICE O/P EST LOW 20 MIN: CPT

## 2023-06-25 RX ORDER — CYCLOBENZAPRINE HCL 10 MG
10 TABLET ORAL 3 TIMES DAILY PRN
Qty: 20 TABLET | Refills: 0 | Status: SHIPPED | OUTPATIENT
Start: 2023-06-25 | End: 2023-07-02

## 2023-06-25 RX ORDER — HYDROCODONE BITARTRATE AND ACETAMINOPHEN 5; 325 MG/1; MG/1
1-2 TABLET ORAL EVERY 6 HOURS PRN
Qty: 10 TABLET | Refills: 0 | Status: SHIPPED | OUTPATIENT
Start: 2023-06-25 | End: 2023-06-30

## 2023-06-25 NOTE — ED INITIAL ASSESSMENT (HPI)
Low back pain - x 1 week . Pain worse since Friday. Takes aleve for pain , last dose  Yesterday. Detail Level: Detailed Depth Of Biopsy: dermis Was A Bandage Applied: Yes Size Of Lesion In Cm: 0 Biopsy Type: H and E Biopsy Method: Dermablade Anesthesia Type: 1% lidocaine with epinephrine Anesthesia Volume In Cc: 0.5 Hemostasis: Drysol Wound Care: Petrolatum Dressing: bandage Destruction After The Procedure: No Type Of Destruction Used: Curettage Curettage Text: The wound bed was treated with curettage after the biopsy was performed. Cryotherapy Text: The wound bed was treated with cryotherapy after the biopsy was performed. Electrodesiccation Text: The wound bed was treated with electrodesiccation after the biopsy was performed. Electrodesiccation And Curettage Text: The wound bed was treated with electrodesiccation and curettage after the biopsy was performed. Silver Nitrate Text: The wound bed was treated with silver nitrate after the biopsy was performed. Lab: [id number] Lab Facility: [id number] Consent: Written consent was obtained and risks were reviewed including but not limited to scarring, infection, bleeding, scabbing, incomplete removal, nerve damage and allergy to anesthesia. Post-Care Instructions: I reviewed with the patient in detail post-care instructions. Patient is to keep the biopsy site dry overnight, and then apply bacitracin twice daily until healed. Patient may apply hydrogen peroxide soaks to remove any crusting. Notification Instructions: Patient will be notified of biopsy results. However, patient instructed to call the office if not contacted within 2 weeks. Billing Type: Third-Party Bill Information: Selecting Yes will display possible errors in your note based on the variables you have selected. This validation is only offered as a suggestion for you. PLEASE NOTE THAT THE VALIDATION TEXT WILL BE REMOVED WHEN YOU FINALIZE YOUR NOTE. IF YOU WANT TO FAX A PRELIMINARY NOTE YOU WILL NEED TO TOGGLE THIS TO 'NO' IF YOU DO NOT WANT IT IN YOUR FAXED NOTE.

## 2023-06-25 NOTE — DISCHARGE INSTRUCTIONS
Follow up with your primary care doctor  Take norco as needed for pain ever 6 hours as needed for pain  Take flexeril as needed for muscle spasm  Return if any worsening symptoms or new concern

## 2023-06-27 ENCOUNTER — HOSPITAL ENCOUNTER (OUTPATIENT)
Dept: GENERAL RADIOLOGY | Facility: HOSPITAL | Age: 53
Discharge: HOME | End: 2023-06-27
Attending: INTERNAL MEDICINE
Payer: MEDICAID

## 2023-06-27 DIAGNOSIS — M54.50 ACUTE LOW BACK PAIN, UNSPECIFIED BACK PAIN LATERALITY, UNSPECIFIED WHETHER SCIATICA PRESENT: ICD-10-CM

## 2023-06-27 PROCEDURE — 72110 X-RAY EXAM L-2 SPINE 4/>VWS: CPT | Performed by: INTERNAL MEDICINE

## 2023-07-17 ENCOUNTER — TELEPHONE (OUTPATIENT)
Dept: INTERNAL MEDICINE CLINIC | Facility: CLINIC | Age: 53
End: 2023-07-17

## 2023-07-17 DIAGNOSIS — R53.83 FATIGUE, UNSPECIFIED TYPE: Primary | ICD-10-CM

## 2023-07-17 NOTE — TELEPHONE ENCOUNTER
Pt saw AD a month ago and they discussed a colonoscopy he is now ready to move forward with it. Pt stated he needs a referral. He has [employer] community ins. He is also not feeling well and he is borderline and would like for AD to order an A1C.

## 2023-09-29 NOTE — TELEPHONE ENCOUNTER
Does not meet criteria for A1c evaluation. There are several labs ordered that he needs to complete. Offer ov with me or soonest available. I have added a CBC to the ordered blood work.

## 2024-07-12 ENCOUNTER — TELEPHONE (OUTPATIENT)
Dept: SURGERY | Facility: CLINIC | Age: 54
End: 2024-07-12

## 2024-07-12 NOTE — TELEPHONE ENCOUNTER
Received medical records request from Sha Mcgarry   for DOS 06/07/22 to Present. Forwarded to [employer] by inter office mail.

## 2024-08-13 ENCOUNTER — TELEPHONE (OUTPATIENT)
Dept: INTERNAL MEDICINE CLINIC | Facility: CLINIC | Age: 54
End: 2024-08-13

## 2024-08-13 NOTE — TELEPHONE ENCOUNTER
LEVON received from Sha Mcgarry. Form sent to [employer] for further processing and a copy of form sent to scanning.

## 2024-08-17 ENCOUNTER — HOSPITAL ENCOUNTER (OUTPATIENT)
Age: 54
Discharge: HOME | End: 2024-08-17
Payer: MEDICAID

## 2024-08-17 ENCOUNTER — APPOINTMENT (OUTPATIENT)
Dept: GENERAL RADIOLOGY | Age: 54
End: 2024-08-17
Attending: PHYSICIAN ASSISTANT
Payer: MEDICAID

## 2024-08-17 VITALS
WEIGHT: 185 LBS | BODY MASS INDEX: 27.4 KG/M2 | TEMPERATURE: 98 F | HEIGHT: 68.9 IN | HEART RATE: 60 BPM | RESPIRATION RATE: 18 BRPM | SYSTOLIC BLOOD PRESSURE: 125 MMHG | OXYGEN SATURATION: 97 % | DIASTOLIC BLOOD PRESSURE: 80 MMHG

## 2024-08-17 DIAGNOSIS — S81.811A LACERATION OF RIGHT LOWER EXTREMITY EXCLUDING THIGH, INITIAL ENCOUNTER: ICD-10-CM

## 2024-08-17 DIAGNOSIS — S99.911A INJURY OF RIGHT ANKLE, INITIAL ENCOUNTER: Primary | ICD-10-CM

## 2024-08-17 NOTE — ED PROVIDER NOTES
Patient Seen in: [facility]      History     Chief Complaint   Patient presents with    Laceration/Abrasion     Stated Complaint: Rt ankle injury    Subjective:   HPI    Ken Ramos is 53 year old male presents with acute right ankle pain due to superificial laceration sustained shortly prior to arrival.  Patient is superficial in nature however is approximately 5 cm in length.  Non radiating, worsened w/ ambulation, weight bearing, and palpation.  No numbness, tingling, parasthesias, skin/ color/ temperature/ sensory changes reported.  No medications taken prior to arrival.           Objective:   Past Medical History:    Back pain              Past Surgical History:   Procedure Laterality Date    Lumbar spine fusion combined  2009                Social History     Socioeconomic History    Marital status:    Tobacco Use    Smoking status: Former    Smokeless tobacco: Never    Tobacco comments:     never really smoked   Vaping Use    Vaping status: Never Used   Substance and Sexual Activity    Alcohol use: No     Alcohol/week: 0.0 standard drinks of alcohol    Drug use: No    Sexual activity: Yes     Partners: Female   Other Topics Concern    Caffeine Concern Yes     Comment: sometimes    Exercise No              Review of Systems   All other systems reviewed and are negative.      Positive for stated Chief Complaint: Laceration/Abrasion    Other systems are as noted in HPI.  Constitutional and vital signs reviewed.      All other systems reviewed and negative except as noted above.    Physical Exam     ED Triage Vitals [08/17/24 1532]   /80   Pulse 60   Resp 18   Temp 97.6 °F (36.4 °C)   Temp src Temporal   SpO2 97 %   O2 Device None (Room air)       Current Vitals:   Vital Signs  BP: 125/80  Pulse: 60  Resp: 18  Temp: 97.6 °F (36.4 °C)  Temp src: Temporal    Oxygen Therapy  SpO2: 97 %  O2 Device: None (Room air)            Physical Exam  Vitals and nursing note reviewed.    Constitutional:       General: He is not in acute distress.     Appearance: Normal appearance. He is normal weight. He is not ill-appearing, toxic-appearing or diaphoretic.   HENT:      Head: Normocephalic and atraumatic.      Nose: Nose normal.   Eyes:      Extraocular Movements: Extraocular movements intact.      Pupils: Pupils are equal, round, and reactive to light.   Cardiovascular:      Rate and Rhythm: Normal rate.      Pulses: Normal pulses.   Pulmonary:      Effort: Pulmonary effort is normal.      Breath sounds: Normal breath sounds.   Musculoskeletal:         General: Signs of injury present. No swelling, tenderness or deformity. Normal range of motion.      Cervical back: Normal range of motion and neck supple.      Right lower leg: No edema.      Left lower leg: No edema.      Comments: 5 cm clean horizontal laceration w/ sharp distinct edges localized to right ankle with extension to dermis       Skin:     General: Skin is warm and dry.      Capillary Refill: Capillary refill takes less than 2 seconds.      Coloration: Skin is not jaundiced or pale.      Findings: No bruising, erythema, lesion or rash.   Neurological:      General: No focal deficit present.      Mental Status: He is alert and oriented to person, place, and time. Mental status is at baseline.   Psychiatric:         Mood and Affect: Mood normal.         Behavior: Behavior normal.         Thought Content: Thought content normal.         Judgment: Judgment normal.               ED Course   Labs Reviewed - No data to display  No orders to display   Procedure: laceration repair  Location: right ankle  Size/ depth: 5 cm/ 1mm  Irrigated with normal saline    Skin: Approximated with Steri-Strips  Patient tolerated procedure well                        MDM                                        Medical Decision Making  53-year-old well-appearing male presents with superficial laceration to right ankle sustained shortly prior to arrival  Plan    - boostrix 0.5mg IM now  - Wound care -> irrigation with normal saline   -Approximation with Steri-Strips (see procedure note)  - sterile dressing with non adherent and bacitracin     - Return to this ED if symptoms of infection occur: Erythema, swelling, evidence of lymphatic streaking, purulent discharge, increased pain, decreased range of motion should occur           Disposition and Plan     Clinical Impression:  1. Injury of right ankle, initial encounter    2. Laceration of right lower extremity excluding thigh, initial encounter         Disposition:  Discharge  8/17/2024  3:36 pm    Follow-up:  Solo Perez MD  [address] [place] [id number] [phone]          [address] [address] [phone]              Medications Prescribed:  Current Discharge Medication List

## 2025-06-08 ENCOUNTER — HOSPITAL ENCOUNTER (OUTPATIENT)
Age: 55
Discharge: HOME | End: 2025-06-08
Payer: MEDICAID

## 2025-06-08 ENCOUNTER — APPOINTMENT (OUTPATIENT)
Dept: GENERAL RADIOLOGY | Age: 55
End: 2025-06-08
Attending: PHYSICIAN ASSISTANT
Payer: MEDICAID

## 2025-06-08 VITALS
HEIGHT: 69 IN | RESPIRATION RATE: 18 BRPM | BODY MASS INDEX: 21.92 KG/M2 | HEART RATE: 62 BPM | WEIGHT: 148 LBS | TEMPERATURE: 98 F | OXYGEN SATURATION: 98 % | SYSTOLIC BLOOD PRESSURE: 110 MMHG | DIASTOLIC BLOOD PRESSURE: 64 MMHG

## 2025-06-08 DIAGNOSIS — S62.633B OPEN DISPLACED FRACTURE OF DISTAL PHALANX OF LEFT MIDDLE FINGER, INITIAL ENCOUNTER: Primary | ICD-10-CM

## 2025-06-08 PROCEDURE — 99214 OFFICE O/P EST MOD 30 MIN: CPT

## 2025-06-08 PROCEDURE — 26750 TREAT FINGER FRACTURE EACH: CPT

## 2025-06-08 PROCEDURE — 73140 X-RAY EXAM OF FINGER(S): CPT | Performed by: PHYSICIAN ASSISTANT

## 2025-06-08 RX ORDER — MUPIROCIN 2 %
1 OINTMENT (GRAM) TOPICAL 3 TIMES DAILY
Qty: 1 EACH | Refills: 0 | Status: SHIPPED | OUTPATIENT
Start: 2025-06-08 | End: 2025-06-22

## 2025-06-08 RX ORDER — CEPHALEXIN 500 MG/1
500 CAPSULE ORAL 4 TIMES DAILY
Qty: 40 CAPSULE | Refills: 0 | Status: SHIPPED | OUTPATIENT
Start: 2025-06-08 | End: 2025-06-18

## 2025-06-08 NOTE — DISCHARGE INSTRUCTIONS
Please return to the ER/clinic if symptoms worsen. Follow-up with your PCP in 24-48 hours as needed.    Gently cleanse the area daily.  Look for any signs and symptoms of infection: redness, swelling, streaking, drainage or fevers.  Take the full course of oral antibiotics as prescribed.  Recommend keeping covered during the day and air drying at night.  Follow-up with Ortho hand for further evaluation and treatment.

## 2025-06-08 NOTE — ED PROVIDER NOTES
Patient Seen in: [facility]        History  Chief Complaint   Patient presents with    Laceration/Abrasion     Stated Complaint: Laceration    Subjective:   HPI            54-year-old male here with complaint of laceration to the left hand third digit.  Patient reports that it occurred with a hedge tremor prior to arrival.  Patient denies any further injury trauma.  Patient is up-to-date with tetanus.  Patient denies chest pain, shortness of breath, cough, abdominal pain, nausea, vomiting or diarrhea.  Afebrile.      Objective:     Past Medical History:    Back pain              Past Surgical History:   Procedure Laterality Date    Lumbar spine fusion combined  2009              The patient's medication list, past medical history and social history elements  as listed in today's nurse's notes are reviewed and agree.   The patient's family history is reviewed and is noncontributory to the presenting problem, except as indicated as above.     Social History     Socioeconomic History    Marital status:    Tobacco Use    Smoking status: Former    Smokeless tobacco: Never    Tobacco comments:     never really smoked   Vaping Use    Vaping status: Never Used   Substance and Sexual Activity    Alcohol use: No     Alcohol/week: 0.0 standard drinks of alcohol    Drug use: No    Sexual activity: Yes     Partners: Female   Other Topics Concern    Caffeine Concern Yes     Comment: sometimes    Exercise No              Review of Systems    Positive for stated complaint: Laceration  Other systems are as noted in HPI.  Constitutional and vital signs reviewed.      All other systems reviewed and negative except as noted above.                  Physical Exam    ED Triage Vitals [06/08/25 1318]   /64   Pulse 62   Resp 18   Temp 97.6 °F (36.4 °C)   Temp src Oral   SpO2 98 %   O2 Device None (Room air)       Current Vitals:   Vital Signs  BP: 110/64  Pulse: 62  Resp: 18  Temp: 97.6 °F (36.4 °C)  Temp src:  Oral    Oxygen Therapy  SpO2: 98 %  O2 Device: None (Room air)            Physical Exam  Vitals and nursing note reviewed.   Constitutional:       Appearance: Normal appearance. He is well-developed.   HENT:      Head: Normocephalic.      Right Ear: External ear normal.      Left Ear: External ear normal.      Nose: Nose normal.      Mouth/Throat:      Mouth: Mucous membranes are moist.   Eyes:      Conjunctiva/sclera: Conjunctivae normal.      Pupils: Pupils are equal, round, and reactive to light.   Cardiovascular:      Rate and Rhythm: Normal rate and regular rhythm.      Heart sounds: Normal heart sounds.   Pulmonary:      Effort: Pulmonary effort is normal.      Breath sounds: Normal breath sounds.   Musculoskeletal:      Cervical back: Normal range of motion and neck supple.   Skin:     General: Skin is warm.      Capillary Refill: Capillary refill takes less than 2 seconds.      Findings: Laceration present.      Comments: L hand 3rd digit: approx 3cm avulsed jagged laceration above the DIP into the nail: FROM, N/V intact, tendon function intact, strength 5/5     Neurological:      General: No focal deficit present.      Mental Status: He is alert and oriented to person, place, and time.   Psychiatric:         Mood and Affect: Mood normal.         Behavior: Behavior normal.         Thought Content: Thought content normal.         Judgment: Judgment normal.               ED Course    Anesthesia Type:Lido 3ml  Location:L hand 3rd digit  Size:approx 3 cm avulsed going into the nail  Shape:vertical avulsed  FB present:No  Cleansing:NS  Wound Closure:5.0 nylon, 4 interrupted sutures placed  N/V intact:Yes  TendonFunctionIntact:Yes  DressingType:bacitracin/non-adherent/coban/splint  Td:UTD  PT Tolerated:without complaint  After discussing the risks, benefits and alternatives patient expresses understanding and verbal consent.      I personally reviewed the xray images and and saw these findings: open fracture  XR  FINGER(S) (MIN 2 VIEWS), LEFT 3RD (CPT=73140)  Result Date: 6/8/2025  PROCEDURE:  XR FINGER(S) (MIN 2 VIEWS), LEFT 3RD (CPT=73140)  INDICATIONS:  pain/injury  COMPARISON:  None.  TECHNIQUE:  Three views of the finger were obtained.  PATIENT STATED HISTORY: (As transcribed by Technologist)  Laceration to distal 3rd digit of left hand. Injury sustained while using hedge clippers.    FINDINGS:  There is an acute fracture involving the tuft of the distal phalanx of the middle finger.  The fracture fragment is displaced slightly distally by 1 mm.  There is no subluxation or dislocation in the middle finger.  There is soft tissue swelling and irregularity at the tip of the finger             CONCLUSION:  Soft tissue swelling, irregularity and tuft fracture of the distal phalanx of the middle finger consistent with an open fracture.   LOCATION:  [facility]   Dictated by (CST): Rishabh Chan MD on 6/08/2025 at 2:20 PM     Finalized by (CST): Rishabh Chan MD on 6/08/2025 at 2:21 PM                        MDM      Clinical Impression: L hand 3rd digit  Course of Treatment:   Gently cleanse the area daily.  Look for any signs and symptoms of infection: redness, swelling, streaking, drainage or fevers.  Take the full course of oral antibiotics as prescribed.  Recommend keeping covered during the day and air drying at night.  Follow-up with Ortho hand for further evaluation and treatment.    The patient is encouraged to return if any concerning symptoms arise. Additional verbal discharge instructions are given and discussed. Discharge medications are discussed. The patient is in good condition throughout the visit today and remains so upon discharge. I discuss the plan of care with the patient, who expresses understanding. All questions and concerns are addressed to the patient's satisfaction prior to discharge today.  Previous conversations with PCP and charts were reviewed.            Disposition and Plan     Clinical  Impression:  1. Open displaced fracture of distal phalanx of left middle finger, initial encounter         Disposition:  Discharge  6/8/2025  2:42 pm    Follow-up:  Solo Perez MD  [address]  [facility] [address]  [facility] [address]  [phone]          Dany Nickerson MD  [address]  [facility] [address]  [facility] [address]  [phone]                Medications Prescribed:  Current Discharge Medication List        START taking these medications    Details   cephALEXin 500 MG Oral Cap Take 1 capsule (500 mg total) by mouth 4 (four) times daily for 10 days.  Qty: 40 capsule, Refills: 0      mupirocin 2 % External Ointment Apply 1 Application topically 3 (three) times daily for 14 days.  Qty: 1 each, Refills: 0                   Supplementary Documentation:

## 2025-06-21 ENCOUNTER — HOSPITAL ENCOUNTER (OUTPATIENT)
Age: 55
Discharge: HOME | End: 2025-06-21
Payer: MEDICAID

## 2025-06-21 VITALS
HEART RATE: 78 BPM | RESPIRATION RATE: 16 BRPM | SYSTOLIC BLOOD PRESSURE: 90 MMHG | TEMPERATURE: 98 F | OXYGEN SATURATION: 97 % | DIASTOLIC BLOOD PRESSURE: 64 MMHG

## 2025-06-21 DIAGNOSIS — Z48.02 ENCOUNTER FOR REMOVAL OF SUTURES: Primary | ICD-10-CM

## 2025-06-21 PROCEDURE — 99024 POSTOP FOLLOW-UP VISIT: CPT | Performed by: NURSE PRACTITIONER

## 2025-06-21 NOTE — ED PROVIDER NOTES
Patient Seen in: Immediate Care [facility]        History  Chief Complaint   Patient presents with    Sut Stap RingRemoval     Stated Complaint: remove stiches    Subjective: This is a 54-year-old male, presents to immediate care clinic for suture removal.  Patient was seen in immediate care on June 8 for laceration to distal aspect of third digit of left hand.  X-ray showed open fracture.  Patient was prescribed outpatient antibiotics.  He is here for suture removal.  Denies any pain, drainage, discharge, warmth, swelling since sutures placed.  No secondary trauma to digit.  He has not followed up with hand specialist or Ortho or his PCP.  AO x 4  The history is provided by the patient.                     Objective:     Past Medical History:    Back pain              Past Surgical History:   Procedure Laterality Date    Lumbar spine fusion combined  2009                Social History     Socioeconomic History    Marital status:    Tobacco Use    Smoking status: Former    Smokeless tobacco: Never    Tobacco comments:     never really smoked   Vaping Use    Vaping status: Never Used   Substance and Sexual Activity    Alcohol use: No     Alcohol/week: 0.0 standard drinks of alcohol    Drug use: No    Sexual activity: Yes     Partners: Female   Other Topics Concern    Caffeine Concern Yes     Comment: sometimes    Exercise No              Review of Systems   Skin:  Positive for wound.   All other systems reviewed and are negative.      Positive for stated complaint: remove stiches  Other systems are as noted in HPI.  Constitutional and vital signs reviewed.      All other systems reviewed and negative except as noted above.                  Physical Exam    ED Triage Vitals [06/21/25 1329]   BP 90/64   Pulse 78   Resp 16   Temp 98.3 °F (36.8 °C)   Temp src Oral   SpO2 97 %   O2 Device None (Room air)       Current Vitals:   Vital Signs  BP: 90/64  Pulse: 78  Resp: 16  Temp: 98.3 °F (36.8 °C)  Temp src:  Oral    Oxygen Therapy  SpO2: 97 %  O2 Device: None (Room air)            Physical Exam  Constitutional:       General: He is not in acute distress.     Appearance: Normal appearance. He is not ill-appearing.   Musculoskeletal:         General: No swelling or tenderness. Normal range of motion.   Skin:     General: Skin is warm.      Capillary Refill: Capillary refill takes less than 2 seconds.      Findings: No erythema.   Neurological:      General: No focal deficit present.      Mental Status: He is alert and oriented to person, place, and time.                 ED Course  Labs Reviewed - No data to display                         MDM     Differentials considered include: Wound check with infection, wound check without infection, abscess, nail avulsion,    Wound check and suture removal without infection.  No soft tissue swelling, erythema, warmth.  No fluctuance.  No drainage or discharge.  No apparent paronychia.    Nail is not avulsed but there is still a laceration through nail.  No sutures placed through nailbed.  Patient is not wearing finger splint on arrival.  He denies any difficulty with range of motion, gripping, bending excetra.  No mallet deformity.    Patient encouraged to continue wear finger splint for another week.  Tylenol and Motrin wound care instructions given to patient.  He verbalized understand agrees with plan of care.      Medical Decision Making      Disposition and Plan     Clinical Impression:  1. Encounter for removal of sutures         Disposition:  Discharge  6/21/2025  1:50 pm    Follow-up:  Solo Perez MD  [address] [place] [address]  [phone]      As needed          Medications Prescribed:  Discharge Medication List as of 6/21/2025  1:52 PM                Supplementary Documentation:

## 2025-06-21 NOTE — DISCHARGE INSTRUCTIONS
As discussed, you can keep covered if frequent to be outside doing yard work, swimming, water exposure excetra.  Leave open to air while at home.  You may apply a small amount of antibiotic ointment to nail once or twice a day.  I recommend continuing to wear finger splint for at least 1 more week.    Tylenol Motrin as needed for pain.    Monitor for signs symptoms infection: Pain, redness, swelling, drainage, discharge, warmth.

## (undated) NOTE — MR AVS SNAPSHOT
[address]  [phone]               Thank you for choosing us for your health care visit with Rios Mcdonnell, TASHA. We are glad to serve you and happy to provide you with this summary of your visit.   Please available in the parking lot in front of the [facility]. When you enter the [facility], please check in with the  reception staff. They will take your name and direct you to our P.T. clinic within the building.  For security purposes, ple Allergies as of Mar 27, 2017     No Known Allergies                   Current Medications          This list is accurate as of: 3/27/17 11:39 AM.  Always use your most recent med list.                Cyclobenzaprine HCl 10 MG Tabs   Take 1 tablet (10 mg to

## (undated) NOTE — Clinical Note
2017          Ken Ramos  :  10/24/1970      To Whom It May Concern: This patient was seen in our office on 2017 .   Work status:  Remain off work until re-evaluation at scheduled appointment on 1 month    May return to work status

## (undated) NOTE — MR AVS SNAPSHOT
[facility] [address] [address] [address] [phone]               Thank you for choosing us for your health care visit with Ramon Albright NP.   We are glad to serve you and happy to provide you with this summary Order:  Ophthalmology - Internal    Rhianna Michaels MD   [address] [address]   Phone:  [phone]   Fax:  [phone]             Elenita Worthy NP   [address] [address]   Phone:  [phone] PH: [phone]    [facility]: [address] [facility] and   [address] [place] [address], [address] [place] [address], [address] [place] [address]: [phone] Your physician has referred you to a specialist.  Your physician or the clinic staff will provide you with the phone number you should call to schedule your appointment.      If you are confident that your benefit plan will not require a referral or authori These medications were sent to [address] [address] [address] [facility], [address], [address] [facility] [address]     Phone:  [phone]    - naproxen 500 MG Tabs            MyChart     Visit Get your heart pumping – brisk walking, biking, swimming Even 10 minute increments are effective and add up over the week   2 ½ hours per week – spread out over time Use a josephine to keep you motivated   Don’t forget strength training with weights and resist

## (undated) NOTE — MR AVS SNAPSHOT
[facility] [address] [phone]               Thank you for choosing us for your health care visit with RADHA Faulkner.   We are glad to serve you and happy to provide you with this summar the building. For security purposes, please check in with the reception staff at every visit.                                           Apr 03, 2017 10:45 AM   PT VISIT BY THERAPIST with TASHA [facility] in [address] [place] [address]  (EDW inside the [address] [place] [address], at [facility] (enter via [facility]). Convenient parking is available in the parking lot in front of the [employer].   When you enter the [employer], please check in with the Commonly known as:  cyclobenzaprine           methylPREDNISolone 4 MG Tbpk   As directed.    Commonly known as:  MEDROL           Polymyxin B-Trimethoprim 82219-9.1 UNIT/ML-% Soln   INT 1 GTT IN LEFT EYE  QID   Commonly known as:  Beth Stapleton

## (undated) NOTE — MR AVS SNAPSHOT
[address], [address] [place] [phone]               Thank you for choosing us for your health care visit with Anuj Garcia MD.  We are glad to serve you and happy to provide you with th ? If your prescription is due for a refill, you may be due for a follow up appointment. ? To best provide you care, patients receiving routine medications need to be seen at least once a year.      protocol for controlled substances:  Written prescr Current Medications          This list is accurate as of: 5/18/17  2:40 PM.  Always use your most recent med list.                diazepam 10 MG Tabs   Take 0.5 tablets (5 mg total) by mouth once as needed for Anxiety (Take 30 minutes prior to MRI, may

## (undated) NOTE — MR AVS SNAPSHOT
[address] [phone]               Thank you for choosing us for your health care visit with Rios Mcdonnell, TASHA. We are glad to serve you and happy to provide you with this summary of your visit.   Please the building. For security purposes, please check in with the reception staff at every visit.                                           May 15, 2017  7:45 AM   PT VISIT BY THERAPIST with Jelly Drummond PT   THE [facility] Physical Therapy in [address] [place] [address] Dr HINOJOSAW Allergies as of May 01, 2017     No Known Allergies                   Current Medications      Notice  As of 5/1/2017 12:14 PM    You have not been prescribed any medications.             MyChart     Visit OpGen  You can access your MyChart to more active

## (undated) NOTE — Clinical Note
Patient Name: La Connell  YOB: 1970          MRN number:  [id number]  Date:  5/1/2017  Referring Physician: Felix Flores NP    Dx: Lumbar spine muscle spasms               Physical Therapy Discharge Report  10 sessions completed. sitting such as with occupation of  with minimal difficulty or pain.   -Complaint and independent in progressive HEP. Plan: d/c to HEP.   Thank you for your referral.  If you have any questions, please contact me at Dept: [phone]    [facility]

## (undated) NOTE — MR AVS SNAPSHOT
[address]  [phone]               Thank you for choosing us for your health care visit with Rios Mcdonnell, PT. We are glad to serve you and happy to provide you with this summary of your visit.   Please available in the parking lot in front of the [employer]. When you enter the [employer], please check in with the  reception staff. They will take your name and direct you to our P.T. clinic within the building.  For security purposes, ple Tobramycin Sulfate 0.3 % Soln   INT 1 GTT IN LEFT EYE QID   Commonly known as:  [id number]                   MyChart     Visit MyChart  You can access your MyChart to more actively manage your health care and view more details from this visit by going

## (undated) NOTE — LETTER
Date: 9/28/2020    Patient Name: La Connell          To Whom it may concern: This letter has been written at the patient's request. The above patient was seen at the [facility] for treatment of a medical condition.     This patient i

## (undated) NOTE — MR AVS SNAPSHOT
[facility] [facility]  [address] [facility], [address] [place], [place] [phone]               Thank you for choosing us for your health care visit with RADHA Maoyrga.   We are glad to serve you and happy to provide you with this inside the [employer], at [facility] (enter via [facility]). Convenient parking is available in the parking lot in front of the [employer].   When you enter the [employer], please check in with the to obtain this authorization for your ordered radiology test.    To schedule an appointment for your radiology test please call Johnna Rebolledo Scheduling   at [phone].          Referral Details     Referred By    Referred To    Bill Kitchen Reason for Today's Visit     New Patient           Medical Issues Discussed Today     Lumbar radiculopathy    -  Primary    History of lumbar fusion        DDD (degenerative disc disease), lumbar          Instructions and Information about Your Health without insurance authorization, patient may be responsible for the entire amount billed. Precertification and Prior Authorizations  If your physician has recommended that you have a procedure or additional testing performed.   Judah Call [phone] for help. Dash is NOT to be used for urgent needs. For medical emergencies, dial 911.            Visit [facility] online at  [email or web address]

## (undated) NOTE — MR AVS SNAPSHOT
[address]  [phone]               Thank you for choosing us for your health care visit with Rios Mcdonnell, PT. We are glad to serve you and happy to provide you with this summary of your visit.   Please available in the parking lot in front of the [employer]. When you enter the [employer], please check in with the  reception staff. They will take your name and direct you to our P.T. clinic within the building.  For security purposes, ple Allergies as of Mar 28, 2017     No Known Allergies                   Current Medications          This list is accurate as of: 3/28/17  3:46 PM.  Always use your most recent med list.                Cyclobenzaprine HCl 10 MG Tabs   Take 1 tablet (10 mg to

## (undated) NOTE — Clinical Note
Patient Name: Rachna Barbosa  YOB: 1970          MRN number:  [id number]  Date:  3/21/2017  Referring Physician:  Sandra Ferguson         SPINE EVALUATION:    Referring Physician: Dr. Sara Lucas    Diagnosis: Lumbar spine muscle spasms    Date of Precautions:  standard  OBJECTIVE:   Observation/Posture: Slouch sitting posture. Guarded trunk motions with sit to stand transfers. Slow but steady gait. Well healed lumbar spine surgical incision (old fusion). No shift or scoliosis noted.  Kyphotic/flex -Improve LE flexibility to WFL's so pt can lift light objects such as bag of groceries from floor with minimal difficulty or pain.   - Improve posture awareness so pt can self correct, tolerate prolonged sitting such as with occupation of  with

## (undated) NOTE — Clinical Note
2017          Ken Ramos  :  10/24/1970      To Whom It May Concern: This patient was seen in our office on 17 and again on 2017 . Work status:  Patient was unable to work during this time.   Patient may return to work on

## (undated) NOTE — MR AVS SNAPSHOT
[address], [address] [place] [phone]               Thank you for choosing us for your health care visit with Aneta Bloch, MD.  We are glad to serve you and happy to provide you with th ? EFFECTIVE April 1, 2017 PATIENTS MUST  THEIR OWN NARCOTIC PRESCRIPTIONS. ? Written prescriptions must be picked up in office. ? Please allow the office 2-3 business days to fill the prescription. ?  Patient must present photo ID at time of pick You can access your MyChart to more actively manage your health care and view more details from this visit by going to [email or web address].   If you've recently had a stay at the Hospital you can access your discharge instructions in [address] by edith

## (undated) NOTE — MR AVS SNAPSHOT
[address] [phone]               Thank you for choosing us for your health care visit with Rios Mcdonnell, PT. We are glad to serve you and happy to provide you with this summary of your visit.   Please testing room. Parents will remain in the MRI waiting area and be reunited with the child upon completion of the MRI.             Apr 10, 2017  7:45 AM   PT VISIT BY THERAPIST with Aurelia Sethi PT   THE [facility] Physical Therapy in [facility] (EDW [address] Take 1 tablet (10 mg total) by mouth nightly as needed for Muscle spasms. Commonly known as:  cyclobenzaprine           methylPREDNISolone 4 MG Tbpk   As directed.    Commonly known as:  MEDROL           Polymyxin B-Trimethoprim 87958-5.1 UNIT/ML-% Soln

## (undated) NOTE — MR AVS SNAPSHOT
[address] [phone]               Thank you for choosing us for your health care visit with Rios Mcdonnell PT. We are glad to serve you and happy to provide you with this summary of your visit.   Please Your appointment is scheduled at the [address] [facility], inside the [facility], at [facility] (enter via [facility]).   Convenient parking is available in the parking lot in front of the Fi Tobramycin Sulfate 0.3 % Soln   INT 1 GTT IN LEFT EYE QID   Commonly known as:  Shan Trellie                   MyChart     Visit MyChart  You can access your MyChart to more actively manage your health care and view more details from this visit by going to http

## (undated) NOTE — MR AVS SNAPSHOT
[address] [address] [facility] [phone]               Thank you for choosing us for your health care visit with Rios Mcdonnell, PT. We are glad to serve you and happy to provide you with this summary of your visit.   Please the building. For security purposes, please check in with the reception staff at every visit.                                           May 10, 2017  7:45 AM   PT VISIT BY THERAPIST with Gregoria Burkitt, PT   THE [facility] Physical Therapy in [address] [place] [address] Dr HINOJOSAW inside the [employer], at [facility] (enter via [facility]). Convenient parking is available in the parking lot in front of the [facility].   When you enter the [facility], please check in with the

## (undated) NOTE — LETTER
05/14/21        Ken Ramos  [facility] [address], [address] [facility]      Dear Carly Jama,    Our records indicate that you have outstanding lab work and or testing that was ordered for you and has not yet been completed:  Orders Placed This Encounter

## (undated) NOTE — MR AVS SNAPSHOT
[address], [address] [place] [phone]               Thank you for choosing us for your health care visit with Jesus Grossman MD.  We are glad to serve you and happy to provide you with th enter the [facility], please check in with the  reception staff. They will take your name and direct you to our P.T. clinic within the building. For security purposes, please check in with the reception staff at every visit. Your physician has recommended you to THE [facility] Physical Therapy. If your insurance requires you to obtain a managed care referral, please allow 3 working days from the date of this order for the referral to be processed.   Please wait 3 working days to sched requirements for authorization, please wait 5-7 days and then contact your physician's office. At that time, you will be provided with any authorization numbers or be assured that none are required. You can then schedule your appointment.  Failure to obtain ? Please allow the office 48-72 hours to fill the prescription. ? Patient must present photo ID at time of .       Scheduling Tests    If your physician has ordered radiology tests such as MRI or CT scans, do not schedule the test until this office discharge instructions in [facility] by going to Visits < Admission Summaries. If you've been to the Emergency Department or your doctor's office, you can view your past visit information in [facility] [employer] by going to Visits < Visit Summaries. [employer] questions?

## (undated) NOTE — Clinical Note
Date: 4/13/2017    Patient Name: Tonja Stover          To Whom it may concern: This letter has been written at the patient's request. The above patient was seen at the [facility] for treatment of a medical condition.     This patient s

## (undated) NOTE — MR AVS SNAPSHOT
[facility] IM [address] [address] [phone]               Thank you for choosing us for your health care visit with RADHA Faulkner.   We are glad to serve you and happy to provide you with this summar staff. They will take your name and direct you to our P.T. clinic within the building. For security purposes, please check in with the reception staff at every visit.                                           Apr 06, 2017  3:30 PM   MRI LUMBAR with [facility] MR RUIZ Your appointment is scheduled at the [facility] Department, inside the [employer], at [facility] (enter via [facility]).   Convenient parking is available in the parking lot in front of the  Take 1 tablet (10 mg total) by mouth nightly as needed for Muscle spasms. Commonly known as:  cyclobenzaprine           methylPREDNISolone 4 MG Tbpk   As directed.    Commonly known as:  MEDROL           Polymyxin B-Trimethoprim 21505-0.1 UNIT/ML-% Soln

## (undated) NOTE — MR AVS SNAPSHOT
[address]  [phone]               Thank you for choosing us for your health care visit with Rios Mcdonnell PT. We are glad to serve you and happy to provide you with this summary of your visit.   Please instructions when taking oral sedation. If you will be taking oral sedation, you must bring a  who will drive you home (the  does not necessarily have to stay throughout the procedure).   If you suspect you may be pregnant, please consult with inside the [address] [place] [address], at [facility] (enter via [facility]). Convenient parking is available in the parking lot in front of the [facility].   When you enter the [facility], please check in with the

## (undated) NOTE — MR AVS SNAPSHOT
[address] [phone]               Thank you for choosing us for your health care visit with Rios Mcdonnell, TASHA. We are glad to serve you and happy to provide you with this summary of your visit.   Please the building. For security purposes, please check in with the reception staff at every visit. Apr 06, 2017  3:30 PM   MRI LUMBAR with [facility] MRI [facility])    Hamzah Yousif inside the [address] [place] [address], at [facility] (enter via [facility]). Convenient parking is available in the parking lot in front of the [facility].   When you enter the [facility], please check in with the Tobramycin Sulfate 0.3 % Soln   INT 1 GTT IN LEFT EYE QID   Commonly known as:  TOBREX           TraMADol HCl 50 MG Tabs   TK 1 T PO Q 6 H PRN   Commonly known as:  ULTRAM                   MyChart     Visit MyChart  You can access your MyChart to more ac

## (undated) NOTE — Clinical Note
Date: 5/18/2017    Patient Name: Casimiro Rajan          To Whom it may concern: This letter has been written at the patient's request. The above patient was seen at the [facility] for treatment of a medical condition.     This patient s